# Patient Record
Sex: FEMALE | Race: WHITE | NOT HISPANIC OR LATINO | Employment: OTHER | URBAN - METROPOLITAN AREA
[De-identification: names, ages, dates, MRNs, and addresses within clinical notes are randomized per-mention and may not be internally consistent; named-entity substitution may affect disease eponyms.]

---

## 2017-10-03 ENCOUNTER — APPOINTMENT (OUTPATIENT)
Dept: CT IMAGING | Facility: HOSPITAL | Age: 82
End: 2017-10-03

## 2017-10-03 ENCOUNTER — HOSPITAL ENCOUNTER (INPATIENT)
Facility: HOSPITAL | Age: 82
LOS: 6 days | Discharge: NURSING, REHAB OR LONG-TERM CARE | End: 2017-10-09
Attending: INTERNAL MEDICINE | Admitting: HOSPITALIST

## 2017-10-03 DIAGNOSIS — R13.13 PHARYNGEAL DYSPHAGIA: Primary | ICD-10-CM

## 2017-10-03 DIAGNOSIS — Z78.9 IMPAIRED MOBILITY AND ADLS: ICD-10-CM

## 2017-10-03 DIAGNOSIS — Z74.09 IMPAIRED FUNCTIONAL MOBILITY, BALANCE, GAIT, AND ENDURANCE: ICD-10-CM

## 2017-10-03 DIAGNOSIS — Z74.09 IMPAIRED MOBILITY AND ADLS: ICD-10-CM

## 2017-10-03 PROBLEM — C34.92 MALIGNANT NEOPLASM OF LEFT LUNG (HCC): Status: ACTIVE | Noted: 2017-10-03

## 2017-10-03 PROBLEM — I10 ESSENTIAL HYPERTENSION: Status: ACTIVE | Noted: 2017-10-03

## 2017-10-03 PROBLEM — G40.909 SEIZURE DISORDER (HCC): Status: ACTIVE | Noted: 2017-10-03

## 2017-10-03 PROBLEM — I44.7 LBBB (LEFT BUNDLE BRANCH BLOCK): Status: ACTIVE | Noted: 2017-10-03

## 2017-10-03 PROBLEM — R55 SYNCOPE: Status: ACTIVE | Noted: 2017-10-03

## 2017-10-03 PROBLEM — J18.9 BILATERAL PNEUMONIA: Status: ACTIVE | Noted: 2017-10-03

## 2017-10-03 PROBLEM — G47.33 OSA TREATED WITH BIPAP: Status: ACTIVE | Noted: 2017-10-03

## 2017-10-03 PROBLEM — J96.01 ACUTE RESPIRATORY FAILURE WITH HYPOXIA (HCC): Status: ACTIVE | Noted: 2017-10-03

## 2017-10-03 LAB
ALBUMIN SERPL-MCNC: 3 G/DL (ref 3.2–4.8)
ALBUMIN/GLOB SERPL: 1.1 G/DL (ref 1.5–2.5)
ALP SERPL-CCNC: 90 U/L (ref 25–100)
ALT SERPL W P-5'-P-CCNC: 30 U/L (ref 7–40)
ANION GAP SERPL CALCULATED.3IONS-SCNC: 2 MMOL/L (ref 3–11)
AST SERPL-CCNC: 34 U/L (ref 0–33)
BASOPHILS # BLD AUTO: 0.02 10*3/MM3 (ref 0–0.2)
BASOPHILS NFR BLD AUTO: 0.2 % (ref 0–1)
BILIRUB SERPL-MCNC: 0.5 MG/DL (ref 0.3–1.2)
BNP SERPL-MCNC: 822 PG/ML (ref 0–100)
BUN BLD-MCNC: 13 MG/DL (ref 9–23)
BUN/CREAT SERPL: 16.3 (ref 7–25)
CALCIUM SPEC-SCNC: 8.9 MG/DL (ref 8.7–10.4)
CHLORIDE SERPL-SCNC: 107 MMOL/L (ref 99–109)
CO2 SERPL-SCNC: 29 MMOL/L (ref 20–31)
CREAT BLD-MCNC: 0.8 MG/DL (ref 0.6–1.3)
DEPRECATED RDW RBC AUTO: 48.8 FL (ref 37–54)
EOSINOPHIL # BLD AUTO: 0.3 10*3/MM3 (ref 0–0.3)
EOSINOPHIL NFR BLD AUTO: 3 % (ref 0–3)
ERYTHROCYTE [DISTWIDTH] IN BLOOD BY AUTOMATED COUNT: 14.6 % (ref 11.3–14.5)
GFR SERPL CREATININE-BSD FRML MDRD: 69 ML/MIN/1.73
GLOBULIN UR ELPH-MCNC: 2.7 GM/DL
GLUCOSE BLD-MCNC: 102 MG/DL (ref 70–100)
GLUCOSE BLDC GLUCOMTR-MCNC: 100 MG/DL (ref 70–130)
GLUCOSE BLDC GLUCOMTR-MCNC: 117 MG/DL (ref 70–130)
GLUCOSE BLDC GLUCOMTR-MCNC: 127 MG/DL (ref 70–130)
HCT VFR BLD AUTO: 33.9 % (ref 34.5–44)
HGB BLD-MCNC: 11.1 G/DL (ref 11.5–15.5)
IMM GRANULOCYTES # BLD: 0.02 10*3/MM3 (ref 0–0.03)
IMM GRANULOCYTES NFR BLD: 0.2 % (ref 0–0.6)
LYMPHOCYTES # BLD AUTO: 0.94 10*3/MM3 (ref 0.6–4.8)
LYMPHOCYTES NFR BLD AUTO: 9.3 % (ref 24–44)
MCH RBC QN AUTO: 30.1 PG (ref 27–31)
MCHC RBC AUTO-ENTMCNC: 32.7 G/DL (ref 32–36)
MCV RBC AUTO: 91.9 FL (ref 80–99)
MONOCYTES # BLD AUTO: 0.83 10*3/MM3 (ref 0–1)
MONOCYTES NFR BLD AUTO: 8.2 % (ref 0–12)
NEUTROPHILS # BLD AUTO: 8.05 10*3/MM3 (ref 1.5–8.3)
NEUTROPHILS NFR BLD AUTO: 79.1 % (ref 41–71)
PLATELET # BLD AUTO: 234 10*3/MM3 (ref 150–450)
PMV BLD AUTO: 9.4 FL (ref 6–12)
POTASSIUM BLD-SCNC: 4.3 MMOL/L (ref 3.5–5.5)
PROT SERPL-MCNC: 5.7 G/DL (ref 5.7–8.2)
RBC # BLD AUTO: 3.69 10*6/MM3 (ref 3.89–5.14)
SODIUM BLD-SCNC: 138 MMOL/L (ref 132–146)
WBC NRBC COR # BLD: 10.16 10*3/MM3 (ref 3.5–10.8)

## 2017-10-03 PROCEDURE — 85025 COMPLETE CBC W/AUTO DIFF WBC: CPT | Performed by: INTERNAL MEDICINE

## 2017-10-03 PROCEDURE — 25010000002 ENOXAPARIN PER 10 MG: Performed by: INTERNAL MEDICINE

## 2017-10-03 PROCEDURE — 80156 ASSAY CARBAMAZEPINE TOTAL: CPT | Performed by: PSYCHIATRY & NEUROLOGY

## 2017-10-03 PROCEDURE — 71275 CT ANGIOGRAPHY CHEST: CPT

## 2017-10-03 PROCEDURE — 63710000001 DIPHENHYDRAMINE PER 50 MG: Performed by: INTERNAL MEDICINE

## 2017-10-03 PROCEDURE — 82962 GLUCOSE BLOOD TEST: CPT

## 2017-10-03 PROCEDURE — 83880 ASSAY OF NATRIURETIC PEPTIDE: CPT | Performed by: INTERNAL MEDICINE

## 2017-10-03 PROCEDURE — 93005 ELECTROCARDIOGRAM TRACING: CPT | Performed by: PHYSICIAN ASSISTANT

## 2017-10-03 PROCEDURE — 25010000003 HYDROCORTISONE SOD SUCCINATE PF 250 MG RECONSTITUTED SOLUTION: Performed by: INTERNAL MEDICINE

## 2017-10-03 PROCEDURE — 80053 COMPREHEN METABOLIC PANEL: CPT | Performed by: INTERNAL MEDICINE

## 2017-10-03 PROCEDURE — 99223 1ST HOSP IP/OBS HIGH 75: CPT | Performed by: INTERNAL MEDICINE

## 2017-10-03 PROCEDURE — 0 IOPAMIDOL PER 1 ML: Performed by: INTERNAL MEDICINE

## 2017-10-03 PROCEDURE — 93010 ELECTROCARDIOGRAM REPORT: CPT | Performed by: INTERNAL MEDICINE

## 2017-10-03 RX ORDER — GLIMEPIRIDE 2 MG/1
2 TABLET ORAL
COMMUNITY

## 2017-10-03 RX ORDER — TORSEMIDE 100 MG/1
100 TABLET ORAL DAILY
COMMUNITY
End: 2017-10-09 | Stop reason: HOSPADM

## 2017-10-03 RX ORDER — ASPIRIN 81 MG/1
81 TABLET ORAL DAILY
COMMUNITY

## 2017-10-03 RX ORDER — DEXTROSE MONOHYDRATE 25 G/50ML
25 INJECTION, SOLUTION INTRAVENOUS
Status: DISCONTINUED | OUTPATIENT
Start: 2017-10-03 | End: 2017-10-09 | Stop reason: HOSPADM

## 2017-10-03 RX ORDER — ERGOCALCIFEROL 1.25 MG/1
50000 CAPSULE ORAL WEEKLY
COMMUNITY

## 2017-10-03 RX ORDER — MEMANTINE HYDROCHLORIDE 5 MG/1
5 TABLET ORAL 2 TIMES DAILY
COMMUNITY

## 2017-10-03 RX ORDER — CARVEDILOL 3.12 MG/1
3.12 TABLET ORAL 2 TIMES DAILY WITH MEALS
COMMUNITY
End: 2017-10-09 | Stop reason: HOSPADM

## 2017-10-03 RX ORDER — DOXAZOSIN 2 MG/1
2 TABLET ORAL NIGHTLY
COMMUNITY
End: 2017-10-09 | Stop reason: HOSPADM

## 2017-10-03 RX ORDER — DIPHENHYDRAMINE HCL 50 MG
50 CAPSULE ORAL ONCE
Status: COMPLETED | OUTPATIENT
Start: 2017-10-03 | End: 2017-10-03

## 2017-10-03 RX ORDER — POTASSIUM CITRATE 5 MEQ/1
TABLET, EXTENDED RELEASE ORAL
COMMUNITY
End: 2017-10-09 | Stop reason: HOSPADM

## 2017-10-03 RX ORDER — LISINOPRIL 5 MG/1
5 TABLET ORAL DAILY
COMMUNITY
End: 2017-10-09 | Stop reason: HOSPADM

## 2017-10-03 RX ORDER — ALLOPURINOL 300 MG/1
300 TABLET ORAL DAILY
COMMUNITY

## 2017-10-03 RX ORDER — HYDRALAZINE HYDROCHLORIDE 20 MG/ML
10 INJECTION INTRAMUSCULAR; INTRAVENOUS EVERY 6 HOURS PRN
Status: DISCONTINUED | OUTPATIENT
Start: 2017-10-03 | End: 2017-10-09 | Stop reason: HOSPADM

## 2017-10-03 RX ORDER — CARBAMAZEPINE 200 MG/1
200 TABLET, EXTENDED RELEASE ORAL 2 TIMES DAILY
COMMUNITY

## 2017-10-03 RX ORDER — NICOTINE POLACRILEX 4 MG
15 LOZENGE BUCCAL
Status: DISCONTINUED | OUTPATIENT
Start: 2017-10-03 | End: 2017-10-09 | Stop reason: HOSPADM

## 2017-10-03 RX ORDER — SODIUM CHLORIDE 0.9 % (FLUSH) 0.9 %
1-10 SYRINGE (ML) INJECTION AS NEEDED
Status: DISCONTINUED | OUTPATIENT
Start: 2017-10-03 | End: 2017-10-09 | Stop reason: HOSPADM

## 2017-10-03 RX ADMIN — CEFEPIME HYDROCHLORIDE 1 G: 1 INJECTION, POWDER, FOR SOLUTION INTRAMUSCULAR; INTRAVENOUS at 22:54

## 2017-10-03 RX ADMIN — ENOXAPARIN SODIUM 40 MG: 40 INJECTION SUBCUTANEOUS at 14:24

## 2017-10-03 RX ADMIN — HYDROCORTISONE SODIUM SUCCINATE 200 MG: 250 INJECTION, POWDER, FOR SOLUTION INTRAMUSCULAR; INTRAVENOUS at 12:45

## 2017-10-03 RX ADMIN — IOPAMIDOL 72 ML: 755 INJECTION, SOLUTION INTRAVENOUS at 13:38

## 2017-10-03 RX ADMIN — CEFEPIME HYDROCHLORIDE 1 G: 1 INJECTION, POWDER, FOR SOLUTION INTRAMUSCULAR; INTRAVENOUS at 14:25

## 2017-10-03 RX ADMIN — DIPHENHYDRAMINE HYDROCHLORIDE 50 MG: 50 CAPSULE ORAL at 12:15

## 2017-10-03 NOTE — H&P
[facility] Medicine Services  HISTORY AND PHYSICAL    Primary Care Physician: Damian Miller MD    Subjective     Chief Complaint:  transfer for syncope s/p code    History of Present Illness:   Ms Beck is a pleasant 82 yof with a hx of CVA with residual generalized weakness, well controlled diabetes A1C 5.8%, RENATA on bipap, hx pf lung CA s/p radiation followed at , seizure disorder on tergretol, two years of syncopal episodes. She present from OSH after presenting there 9/26 s/p coding. Per report patient was seating on her chair, passed out had some jerks and was unresponsive requiring chest compressions.On arrival of EMS she was found to be in Vfib/Vtach and was shocked x1. She was intubated and transfered to OSH ICU. She subsequently underwent a LHC that showed clean coronaries and normal EF, she was extubated, CT chest was concerning for pna and was started on abx. Her EKG showed LBBB. The etiology of her syncope is unknown, she was transferred here for likely leadless pacemaker placement. On arrival here patient was hemodynamically stable, on 3LO2, she was awake and alert with some confusion, did endorse chest discomfort from the compression.      Review of Systems   Otherwise complete 10 system ROS performed and negative except as mentioned in the HPI.    Past Medical History:   Diagnosis Date   • Alzheimer disease    • Diabetes mellitus    • Gout    • Hypertension        Past Surgical History:   Procedure Laterality Date   • CATARACT EXTRACTION     • CERVICAL DISCECTOMY ANTERIOR     • HYSTERECTOMY     • JOINT REPLACEMENT      Bilateral Knees       History reviewed. No pertinent family history.    Social History     Social History   • Marital status:      Spouse name: N/A   • Number of children: N/A   • Years of education: N/A     Occupational History   • Not on file.     Social History Main Topics   • Smoking status: Never Smoker   • Smokeless tobacco: Never Used   •  "Alcohol use No   • Drug use: No   • Sexual activity: Defer     Other Topics Concern   • Not on file     Social History Narrative   • No narrative on file       Medications:  No prescriptions prior to admission.       Allergies:  Allergies   Allergen Reactions   • Quinolones Anaphylaxis   • Vancomycin Anaphylaxis   • Aluminum-Containing Compounds Swelling and Rash     Aluminum Hydroxide   • Amoxicillin Rash   • Ciprofloxacin Other (See Comments)     Unknown Reaction   • Levofloxacin Other (See Comments)     Unknown Reaction   • Prednisone Other (See Comments)     Unknown Reaction   • Shrimp Flavor Other (See Comments)     Unknown Reaction   • Cefazolin Diarrhea   • Contrast Dye Rash   • Diclofenac Swelling and Rash   • Etodolac Swelling and Rash   • Ibuprofen Swelling and Rash   • Indomethacin Swelling and Rash   • Ketorolac Swelling and Rash   • Meclofenamate Swelling and Rash   • Mefenamic Acid Swelling and Rash   • Meloxicam Swelling and Rash   • Misoprostol Swelling and Rash   • Naproxen Swelling and Rash   • Oxaprozin Swelling and Rash   • Piroxicam Swelling and Rash   • Sulindac Swelling and Rash         Objective     Physical Exam:  Vital Signs: /81 (BP Location: Left arm, Patient Position: Lying)  Pulse 84  Temp 97.1 °F (36.2 °C) (Temporal Artery )   Resp 18  Ht 66\" (167.6 cm)  Wt 166 lb (75.3 kg)  SpO2 96%  BMI 26.79 kg/m2  Physical Exam    Constitutional: No acute distress, awake, alert  Eyes: PERRLA, sclerae anicteric, no conjunctival injection  HENT: NCAT, mucous membranes moist  Neck: Supple, no thyromegaly, no lymphadenopathy, trachea midline  Respiratory: Clear to auscultation bilaterally, nonlabored respirations   Cardiovascular: RRR, no murmurs, rubs, or gallops, palpable pedal pulses bilaterally  Gastrointestinal: Positive bowel sounds, soft, nontender, nondistended  Musculoskeletal: No bilateral ankle edema, no clubbing or cyanosis to bilateral lower extremities  Psychiatric: Oriented " x 3, appropriate affect, cooperative  Neurologic: Strength symmetric in all extremities, Cranial Nerves grossly intact to confrontation, speech clear  Skin: No rashes    Results Reviewed:    I have personally reviewed and interpreted available lab data, radiology studies and ECG obtained at time of admission.     Assessment / Plan     Problem List:   Hospital Problem List     * (Principal)Syncope with collapse    LBBB (left bundle branch block)    Seizure disorder    RENATA treated with BiPAP    Acute respiratory failure with hypoxia (POA)    Essential hypertension    Malignant neoplasm of left lung s/p radiation    Bilateral pneumonia          Assessment:  82 yof with a hx of CVA with residual generalized weakness, well controlled diabetes A1C 5.8%, RENATA on bipap, hx pf lung CA s/p radiation followed at , seizure disorder and two years of syncopal episodes, she was transferred here for possible leadless pacemaker placement    Plan:  - Hx of syncope with collapse, multiple episodes in the past, etiology unknown, extensive w/u (data deficient) unrevealing, suspect PE with underlying hx of lung malignancy, BNP elevated at OSH remains elevated will get CTA pre-treat before.  - Patient with LBBB, s/p VT/Vfib, LHC with clean coronaries and normal EF, cardiology following.  - Bilateral LL consolidation and opacifications, concerns for aspiration pna, will start cefepime  - consult SLP to evaluate for aspiration, keep NPO for now  - Hx of lung CA s/p radiation x5, no chemo followed at , get records  - Hx of sz disorder, continue home rx  - Hx of well controlled DM A1C 5.8 continue SSI  - Hx of RENATA continue home bipap    DVT prophylaxis: Lovenox    Code Status: Full    Admission Status: Patient will be admitted to INPATIENT status due to the need for care which can only be reasonably provided in an hospital setting such as aggressive/expedited ancillary services and/or consultation services, the necessity for IV  medications, close physician monitoring and/or the possible need for procedures.  In such, I feel patient’s risk for adverse outcomes and need for care warrant INPATIENT evaluation and predict the patient’s care encounter to likely last beyond 2 midnights.     Jie Jimenez MD 10/03/17 1:30 PM

## 2017-10-03 NOTE — CONSULTS
[facility] at [facility] - Cardiology Consult    Sarah Beck  1935  2528/1    Admit Date:  10/3/2017      PCP:  MD Petra Philip MD:  Dr. Jimenez, Hospitalist  Consulting MD:  Jatin Jesus MD    10/03/17    CC:  Weakness,  Confusion, Syncope.  Reason for Consult: Syncope, recurrent.    PROBLEM LIST/PMHx:    1.  Recurrent Syncope   A.  First episode 2 years ago, idb.   B.  Event Monitor/holter 6 months ago reportedly showed no arrhythmia, no pauses.   C.  No associated injuries.  2. LBBB  3.  Essential HTN  4.  Dyslipidemia  5.  Non insulin dependent Diabetes Mellitus II  6.  RENATA with bipap use.  7.  VF Arrest   A.  Syncope and collapse while sitting in chair.  Pt received CPR by family members, 9/26/17.   B.  EMS arrived with  VF noted on AED, received shockk x1.  No further recurrence.   C.  [facility], Dr. Rasmussen:  Normal EF, No significant CAD.   D.  Echo:  EF 65%, LA 2.9 cm, trace MR, mild TR.  8.  Seizure Disorder, on chronic Tegretol  9.  Lung Carcinoma   A.  Left Lower Lobe.  S/P 5 Radiation tx, March 2017.  First CT s/p radiation showed improvement.  Next CT scheduled for October 24.   B. Followed by Dr. Duffy, [facility].   10.  Bilateral Lobe Pneumonia   A.  Associated cough with progression.  LGF at outside facility.   B.  Tx with IV Zyvox, IV Merrem at outside facility   C.  Probable aspiration  11.  Confusion, possibly early dementia  12. History of Bladder Cancer  13.  History of CVA x2; data insufficient.           Allergies:  is allergic to quinolones; vancomycin; aluminum-containing compounds; amoxicillin; ciprofloxacin; levofloxacin; prednisone; shrimp flavor; cefazolin; contrast dye; diclofenac; etodolac; ibuprofen; indomethacin; ketorolac; meclofenamate; mefenamic acid; meloxicam; misoprostol; naproxen; oxaprozin; piroxicam; and sulindac.     prescriptions prior to admission:  Reviewed in transfer records.  Not yet entered in computer at time of consultation.   "          CARDIAC RISK FACTORS:   advanced age (older than 55 for men, 65 for women), diabetes mellitus, dyslipidemia, family history of premature cardiovascular disease and hypertension.         HPI:  Mrs. Beck is an 82-year-old  female with a noted above history, who was initially transferred to [facility] today to undergo possible pacemaker (leadless) implantation.       Over the last 2 years, she's had recurrent syncopal episodes.  The patient and family report there is loss of consciousness but no injury. She lives with her  and grown son.   The family states her activity has been progressively declining, she has more weakness, and her memory is getting worse.  She wore a Holter monitor several months ago for recurrent syncope, which reportedly did not show any arrhythmias or pauses.  She had a recurrent event 6 weeks ago and was hospitalized for observation without any findings.  The family and patient state that she has some type of warning but cannot describe it.  She often finds herself yelling out \"oh no\" prior to an event.  The last two episodes occurred while sitting in a chair.  She cried out and fell on the floor.  CPR was performed by family members prior to EMS arriving.  EMS arrived and the patient was found to be in VF versus pulseless VT.  She did receive a shock ×1 and converted to normal sinus rhythm.  She's had no arrhythmia since admission at the outside facility.  She also was temporarily intubated at outside facility and was extubated without difficulty.  She underwent left heart catheterization:   near normal coronaries, normal EF.  While she was in the hospital, she had an event of confusion and unresponsiveness.  She evidently pulled her port out and all telemetry pads off.  She \"came to\" a few minutes later and could not recall what happened.  The family states this is what occurs at home.     She denies chest pain or exertional dyspnea.  She denies tachycardia or " palpitations. She denies any lower extremity swelling or redness.  She has had a cough for several months that has worsened. She denies production or hemoptysis.  Aspiration has been mentioned to her several times but the family states no formal evaluation has been made.  She is currently followed for lung cancer.  She received 5 rounds of radiation, March 2017.  A follow-up CT of her chest showed improvement, and her next appointment is October 24.  The family and patient state that she has been compliant with all medications including her Tegretol. She does follow routinely with her primary care (Dr. Damian Miller), and her cardiologist (Dr. Rasmussen).     The family at bedside ( and daughter) is quite concerned about her memory, confusion, recurrent syncopal spells, and aspiration.  They are not in favor of proceeding with pacemaker implantation without a more definitive reason.  They have been in contact with [facility] in [place] for anticipated rehabilitation at time of discharge.  I was able to look through transferred records and discuss past medical history with the patient, her , and her daughter.  At this time, we will defer pacemaker implantation.  She will be admitted to the hospitalists.  We will follow along in consultation. Thank you.        Social History     Social History   • Marital status:      Spouse name: N/A   • Number of children: N/A   • Years of education: N/A     Occupational History   • Not on file.     Social History Main Topics   • Smoking status: Never Smoker   • Smokeless tobacco: Never Used   • Alcohol use No   • Drug use: No   • Sexual activity: Defer     Other Topics Concern   • Not on file     Social History Narrative   • No narrative on file     family history:  + HTN, + Diabetes   past surgical history:  Hysterectomy, Port Placement, Bilateral Knee Replacements, C Spine Surgery, Cataract removal.    ROS: Pertinent items are noted in HPI, all  "other systems reviewed and negative     Objective     height is 66\" (167.6 cm) and weight is 166 lb (75.3 kg). Her temporal artery  temperature is 97.1 °F (36.2 °C). Her blood pressure is 165/81 and her pulse is 84. Her respiration is 18 and oxygen saturation is 96%.  No intake or output data in the 24 hours ending 10/03/17 1103      Physical Exam:  General Appearance:    Alert, cooperative, in no acute distress   Head:    Normocephalic, without obvious abnormality, atraumatic   Eyes:            Lids and lashes normal, conjunctivae and sclerae normal, no   icterus, no pallor, corneas clear, PERRLA   Ears:    Ears appear intact with no abnormalities noted   Throat:   No oral lesions, no thrush, oral mucosa moist.   Neck:   No adenopathy, supple, trachea midline, no thyromegaly, no     carotid bruit, no JVD   Back:     No kyphosis present, no scoliosis present, no skin lesions,       erythema or scars, no tenderness to percussion or                   palpation,   range of motion normal   Lungs:     Clear to auscultation but coarse, respirations regular, even and unlabored.  Decreased at bases. + Cough.    Heart:    Regular rhythm and normal rate, normal S1 and S2, no            murmur, no gallop, no rub, no click       Abdomen:     Normal bowel sounds, no masses, no organomegaly, soft        non-tender, non-distended, no guarding, no rebound                 Tenderness.  Obese.       Extremities:   Moves all extremities well,  no cyanosis, no redness, no edema   Pulses:   Pulses palpable and equal bilaterally   Skin:   No bleeding, bruising or rash.  Port right side neck.   Lymph nodes:   No palpable adenopathy   Neurologic:   Cranial nerves 2 - 12 grossly intact, sensation intact, DTR        present and equal bilaterally          Results Review:  I personally reviewed the patient's clinical results.          I reviewed labs from outside facility.  No current labs have been drawn here at [facility].    "     Radiology:  Imaging Results (last 72 hours)     ** No results found for the last 72 hours. **            Tele:  NSR, LBBB    Assessment/Plan     1.  Recurrent Syncope, without injury:   -  To date, no documented arrhythmias or pauses.  Patient wore an event monitor several months ago - we'll try to obtain results.   -  Rule out sinus arrest.   -  Hypercoagulable.  Consider CTA.       2.  Ventricular fibrillation   -  No records available at time of transfer.  Try to obtain any telemetry strips of arrhythmia.   -  Patient did receive CPR and shock ×1.   -  Essentially normal coronary arteries, normal EF.    3.  Left bundle branch block    4.  Pneumonia   -  Progressive cough over the last 6 months, worrisome for aspiration.   -  CT chest at outside facility shows bilateral lobe pneumonia.  Treated with IV Merrem, IV Zyvox.    5.  Seizure disorder   -  Diagnosed 20 years ago.  On chronic Tegretol.    6.  Essential hypertension   -  Poorly controlled.  Adjust current medications.   -  Monitor renal function.    7.  RENATA   -  Continue with BiPAP    8.  Left lower lobe lung carcinoma   -  Status post radiation ×5, March 2017.  First CT s/p radiation showed improvement.  Next CT scheduled for October 24.   -  Followed by Dr. Duffy, [facility].         I discussed the patients findings and my recommendations with the patient, any present family members, and the nursing staff.  Jatin Jesus MD saw and examined patient, verified hx and PE, read all radiographic studies, reviewed labs and micro data, and formulated dx, plan for treatment and all medical decision making.      Peggy Holden PA-C, functioning as a scribe for Jatin Jesus MD  10/03/17 11:03 AM      EMR Dragon/Transcription disclaimer:   Much of this encounter note is an electronic transcription/translation of spoken language to printed text. The electronic translation of spoken language may permit erroneous, or at times, nonsensical words or phrases to be  inadvertently transcribed; Although I have reviewed the note for such errors, some may still exist.

## 2017-10-03 NOTE — SIGNIFICANT NOTE
10/03/17 1432   SLP Deferred Reason   SLP Deferred Reason Patient unavailable for evaluation  (New orders received. Pt RJ for procedure. SLP to follow up 10/4.)

## 2017-10-03 NOTE — PROGRESS NOTES
"Pharmacy Consult-Cefepime Dosing  Sarah Beck is a  82 y.o. female receiving cefepime therapy.     Indication: Pneumonia  Consulting Provider: Tony  ID Consult: No    Ht - 66\" (167.6 cm)  Wt - 166 lb (75.3 kg)    Current Antimicrobial Therapy  Cefepime 1g IV q8h (extended-infusion)    Patient is in CVOU, SCr has not been collected yet. Unknown renal function at this time. Will plan to dose aggressive up front and follow results of CMP and assess need to adjust cefepime dose.     Allergies  Quinolones; Vancomycin; Aluminum-containing compounds; Amoxicillin; Ciprofloxacin; Levofloxacin; Prednisone; Shrimp flavor; Cefazolin; Contrast dye; Diclofenac; Etodolac; Ibuprofen; Indomethacin; Ketorolac; Meclofenamate; Mefenamic acid; Meloxicam; Misoprostol; Naproxen; Oxaprozin; Piroxicam; and Sulindac    Jes Hernandez, Ashley  Pharmacy Resident  10/3/2017  12:27 PM  "

## 2017-10-03 NOTE — NURSING NOTE
ACC REVIEW REPORT: [facility]        PATIENT NAME: Sarah Beck    PATIENT ID: [id number]    BED: 9801    BED TYPE: CVOU- TELE    BED GIVEN TO:DAYAN    TIME BED GIVEN: 0450    YOB: 1935    AGE: 82 YEARS OLD    GENDER: F    TODAY'S DATE: 10/3/2017    TRANSFER DATE: 10/03/2017    ETA: 0700    TRANSFERRING FACILITY: [facility]    TRANSFERRING FACILITY PHONE # : [phone]    DATE/TIME REQUEST RECEIVED: [address] [place] [address] RN: BERNIE SAEED RN    REPORT FROM: DAYAN- CVU    TIME REPORT TAKEN: 0450    DIAGNOSIS: SSS    REASON FOR TRANSFER TO [facility]: HIGHER LEVEL OF CARE    TRANSPORTATION: AMBULANCE    CLINICAL REASON FOR TRANSFER TO [facility]: PT WENT INTO CARDIAC ARREST AT HOME, SON STARTED CPR UNTIL EMS ARRIVAL AND PT WAS TRANSPORTED TO [facility] ON SEPT. 26TH AND SPENT 3 DAYS IN THE ICU AND THEN TRANSFERRED TO CVU.       CLINICAL INFORMATION    ALLERGIES: CONTRAST DYE; AMOXICILLIN; CEFZOLIN; CIPRO; IBUPROFEN; INDOMETHACIN; TORADOL; LEVAQUIN; NAPROXEN; PREDNISONE; VANCOMYCIN AND MELOXICAM    LAST VITAL SIGNS:  TIME: 0445  PULSE: 86 SINUS RHYTHM   B/P:147/63  RESP:18    LAB INFORMATION: BNP 1600- 09/30/17    MEDS/IV FLUIDS: TRIPLE LUMEN RIGHT NECK      CARDIAC SYSTEM:    CHEST PAIN: NO    RATE: 86    RHYTHM: SINUS RHYTHM    Is patient taking or has patient been given any drugs that could increase bleeding?HEPARIN WHILE ADMITTED       CARDIAC ENZYMES:    DATE: 09/26/17  TROP: 0.065    DATE: 09/28/17  TROP: 0.2      HEART CATH: NEGATIVE FOR INTERVENTION    OXYGEN: 3L PER NC    O2 SAT: 96%    ADMINISTRATION ROUTE: NASAL CANNULA    CNS/MUSCULOSKELETAL    ALERT AND ORIENTED: A/O x4    JORGE COMA SCALE: GCS 15    CNS/MUSCULOSKELETAL NOTES: PT HAS DRESSING TO RIGHT GROIN S/P HEART CATH    PAST MEDICAL HISTORY: CVA x2 WITH NO KNOWN DEFICITS; BLADDER CANCER WITH RADIATION; SEIZURES; DIABETES, MI, SLEEP APNEA WITH HOME BIPAP USE    ADDITIONAL NOTES: ON 10/1/17 PT HAD AN EPISODE IN THE HOSPITAL  WHERE SHE BECAME CONFUSED, PULLED OUT DEEP LINE AND WENT INTO SINUS ARREST, NO INTERVENTION WAS NEEDED.           Miguelina Kirk RN  10/3/2017  4:52 AM

## 2017-10-04 ENCOUNTER — APPOINTMENT (OUTPATIENT)
Dept: GENERAL RADIOLOGY | Facility: HOSPITAL | Age: 82
End: 2017-10-04
Attending: INTERNAL MEDICINE

## 2017-10-04 ENCOUNTER — APPOINTMENT (OUTPATIENT)
Dept: MRI IMAGING | Facility: HOSPITAL | Age: 82
End: 2017-10-04

## 2017-10-04 LAB
CARBAMAZEPINE SERPL-MCNC: 8 MCG/ML (ref 4–10)
GLUCOSE BLDC GLUCOMTR-MCNC: 102 MG/DL (ref 70–130)
GLUCOSE BLDC GLUCOMTR-MCNC: 132 MG/DL (ref 70–130)
GLUCOSE BLDC GLUCOMTR-MCNC: 92 MG/DL (ref 70–130)
GLUCOSE BLDC GLUCOMTR-MCNC: 97 MG/DL (ref 70–130)

## 2017-10-04 PROCEDURE — 25010000002 ENOXAPARIN PER 10 MG: Performed by: INTERNAL MEDICINE

## 2017-10-04 PROCEDURE — 99223 1ST HOSP IP/OBS HIGH 75: CPT | Performed by: PSYCHIATRY & NEUROLOGY

## 2017-10-04 PROCEDURE — 74230 X-RAY XM SWLNG FUNCJ C+: CPT

## 2017-10-04 PROCEDURE — 92610 EVALUATE SWALLOWING FUNCTION: CPT

## 2017-10-04 PROCEDURE — 70551 MRI BRAIN STEM W/O DYE: CPT

## 2017-10-04 PROCEDURE — 25010000002 CEFEPIME

## 2017-10-04 PROCEDURE — 92611 MOTION FLUOROSCOPY/SWALLOW: CPT

## 2017-10-04 PROCEDURE — 99232 SBSQ HOSP IP/OBS MODERATE 35: CPT | Performed by: INTERNAL MEDICINE

## 2017-10-04 PROCEDURE — 82962 GLUCOSE BLOOD TEST: CPT

## 2017-10-04 RX ORDER — CARBAMAZEPINE 200 MG/1
200 TABLET ORAL EVERY 12 HOURS SCHEDULED
Status: DISCONTINUED | OUTPATIENT
Start: 2017-10-04 | End: 2017-10-09 | Stop reason: HOSPADM

## 2017-10-04 RX ADMIN — ENOXAPARIN SODIUM 40 MG: 40 INJECTION SUBCUTANEOUS at 08:53

## 2017-10-04 RX ADMIN — CEFEPIME HYDROCHLORIDE 1 G: 1 INJECTION, POWDER, FOR SOLUTION INTRAMUSCULAR; INTRAVENOUS at 05:57

## 2017-10-04 RX ADMIN — CARBAMAZEPINE 200 MG: 200 TABLET ORAL at 15:58

## 2017-10-04 RX ADMIN — BARIUM SULFATE 20 ML: 400 PASTE ORAL at 14:31

## 2017-10-04 RX ADMIN — CEFEPIME 2 G: 2 INJECTION, POWDER, FOR SOLUTION INTRAVENOUS at 17:44

## 2017-10-04 RX ADMIN — BARIUM SULFATE 100 ML: 0.81 POWDER, FOR SUSPENSION ORAL at 14:32

## 2017-10-04 RX ADMIN — CARBAMAZEPINE 200 MG: 200 TABLET ORAL at 20:50

## 2017-10-04 NOTE — PLAN OF CARE
Problem: Patient Care Overview (Adult)  Goal: Plan of Care Review  Outcome: Ongoing (interventions implemented as appropriate)    10/04/17 1550   Coping/Psychosocial Response Interventions   Plan Of Care Reviewed With patient;spouse   Patient Care Overview   Progress no change   Outcome Evaluation   Outcome Summary/Follow up Plan pt continues complaints of fatigue, will advance to diet recommendations per SLP         Problem: Fall Risk (Adult)  Goal: Identify Related Risk Factors and Signs and Symptoms  Outcome: Outcome(s) achieved Date Met:  10/04/17  Goal: Absence of Falls  Outcome: Ongoing (interventions implemented as appropriate)    Problem: Older Inpatient, Acutely Ill (Adult)  Goal: Signs and Symptoms of Listed Potential Problems Will be Absent or Manageable (Older Inpatient, Acutely Ill)  Outcome: Ongoing (interventions implemented as appropriate)

## 2017-10-04 NOTE — PLAN OF CARE
"Problem: Patient Care Overview (Adult)  Goal: Plan of Care Review  Outcome: Ongoing (interventions implemented as appropriate)    10/04/17 1202   Coping/Psychosocial Response Interventions   Plan Of Care Reviewed With patient;family   Patient Care Overview   Progress (eval)   Outcome Evaluation   Outcome Summary/Follow up Plan Clinical dysphagia eval complete. Pt alert, cooperative, though confused. Pt's daughter reports a swallow evaluation was completed at [facility] recently. It was recommended the pt be on a regular diet and thin liquids; however, the pt's daughter continues to express concern. Pt and pt's daughter report foods and liquids recently becoming difficult to swallow and foods feeling \"stuck\" in throat. Trialed thins via spoon/cup/straw, nectar thick via cup/straw, puree, and solid. Immediate cough w/ thins. No overt clinical s/s aspiration w/ any other consistencies.   REC: continue NPO. Meds crushed w/ applesauce. SLP will f/u w/ MBS to r/o pharyngeal dysphagia.            "

## 2017-10-04 NOTE — PROGRESS NOTES
"      HOSPITALIST DAILY PROGRESS NOTE    Chief Complaint: f/u for syncope    Subjective   SUBJECTIVE/OVERNIGHT EVENTS   No acute events overnight, patient states that she is doing well, denies any CP no SOA, does endorse a cough    Review of Systems:  Gen-no fevers, no chills  CV-no chest pain, no palpitations  Resp-+ cough, no dyspnea  GI-no N/V/D, no abd pain    Otherwise complete ROS is negative except as mentioned in the HPI.    Objective   OBJECTIVE   I have reviewed the vital signs.  /60 (BP Location: Left arm, Patient Position: Lying)  Pulse 95  Temp 98.3 °F (36.8 °C) (Temporal Artery )   Resp 18  Ht 66\" (167.6 cm)  Wt 158 lb 4.8 oz (71.8 kg)  SpO2 93%  BMI 25.55 kg/m2    Physical Exam:  Gen-no acute distress, seated in chair comfortably  CV-RRR, S1 S2 normal, no m/r/g  Resp-CTAB, no wheezes  Abd-soft, NT, ND, +BS  Ext-no edema  Neuro-A&Ox3, no focal deficits  Psych-appropriate mood and affect    Results:  I have reviewed the labs, radiology results, and diagnostic studies.      Results from last 7 days  Lab Units 10/03/17  1224   WBC 10*3/mm3 10.16   HEMOGLOBIN g/dL 11.1*   HEMATOCRIT % 33.9*   PLATELETS 10*3/mm3 234       Results from last 7 days  Lab Units 10/03/17  1224   SODIUM mmol/L 138   POTASSIUM mmol/L 4.3   CHLORIDE mmol/L 107   CO2 mmol/L 29.0   BUN mg/dL 13   CREATININE mg/dL 0.80   GLUCOSE mg/dL 102*   CALCIUM mg/dL 8.9     Radiology Results:  Imaging Results (last 24 hours)     Procedure Component Value Units Date/Time    CT Angiogram Chest With & Without Contrast [[id number]] Collected:  10/03/17 1356     Updated:  10/03/17 1651    Narrative:       EXAMINATION: CT ANGIOGRAM CHEST W WO CONTRAST- 10/03/2017     INDICATION: syncope with collapse      TECHNIQUE: CT angiogram chest with and without intravenous contrast. 2-D  reconstructions performed.     The radiation dose reduction device was turned on for each scan per the  ALARA (As Low as Reasonably Achievable) protocol.   "   COMPARISON: NONE     FINDINGS: Thyroid is homogeneous in attenuation without dominant nodule  identified. No bulky mediastinal adenopathy. Cardiac size enlarged  without pericardial effusion. Atherosclerotic involvement of the aortic  arch and great vessel origins without significant luminal impact.  Nonaneurysmal thoracic aorta. No evidence for acute aortic injury,  pseudoaneurysm or dissection. Visualized portions of the abdominal aorta  demonstrate moderate to severe luminal narrowing of the celiac origin as  well as mild to moderate SMA origin narrowing. Central pulmonary  arterial system is well-opacified without filling defect to suggest  pulmonary embolus. Central airways are patent. Extended lung windows  demonstrate confluent opacifications involving the left lower lobe  superior and lateral segments with air bronchograms present. Bilateral  small volume pleural effusions with adjacent atelectasis. Minimal  pulmonary nodularity likely reactive noted within the right upper lobe.  Multilevel degenerative changes of the spine without aggressive osseous  lesion identified. ACDF hardware in place lower cervical segments. No  aggressive soft tissue body wall findings. Visualized portions of the  upper abdomen grossly unremarkable with the exception of aforementioned  vascular findings.       Impression:       1. No evidence for acute aortic injury, pseudoaneurysm or dissection  with atherosclerotic involvement greatest in the abdominal portions  involving the celiac and SMA origins.  2. No central pulmonary filling defect to suggest pulmonary embolus.  3. Consolidative opacifications left lower lobe consistent with acute  infiltrates.  4. Bilateral small volume pleural effusions.     D:  10/03/2017  E:  10/03/2017     This report was finalized on 10/3/2017 4:49 PM by Dr. Chucho Peña.           I have reviewed the medications.    Assessment/Plan   ASSESSMENT/PLAN    Principal Problem:    Syncope with  collapse  Active Problems:    LBBB (left bundle branch block)    Seizure disorder    RENATA treated with BiPAP    Acute respiratory failure with hypoxia (POA)    Essential hypertension    Malignant neoplasm of left lung s/p radiation    Bilateral pneumonia    82 yof with a hx of CVA with residual generalized weakness, well controlled diabetes A1C 5.8%, RENATA on bipap, hx pf lung CA s/p radiation followed at , seizure disorder and two years of syncopal episodes, she was transferred here for possible leadless pacemaker placement.     Plan:  - Hx of syncope with collapse, multiple episodes in the past, etiology unknown, extensive w/u (data deficient) unrevealing, suspected PE CTA negative, episodes preceded by seizure like activity,will ask neurology to assist.  - Patient with LBBB, s/p VT/Vfib, LHC with clean coronaries and normal EF, cardiology following.  - Bilateral LL consolidation and opacifications, concerns for aspiration pna, will start cefepime, consult SLP to evaluate for aspiration, keep NPO for now  - Hx of lung CA s/p radiation x5, no chemo followed at , get records  - Hx of sz disorder, continue home rx  - Hx of well controlled DM A1C 5.8 continue SSI  - Hx of RENATA continue home bipap     DVT prophylaxis: Lovenox    Dispo: anticipate d/c to rehab    Jie Jimenez MD  10/04/17  9:55 AM

## 2017-10-04 NOTE — MBS/VFSS/FEES
Acute Care - Speech Language Pathology   Swallow Initial Evaluation  [facility]   Modified Barium Swallow Study (MBS)       Patient Name: Sarah Beck  : 1935  MRN: [id number]  Today's Date: 10/4/2017               Admit Date: 10/3/2017    Visit Dx:     ICD-10-CM ICD-9-CM   1. Pharyngeal dysphagia R13.13 787.23     Patient Active Problem List   Diagnosis   • Syncope with collapse   • LBBB (left bundle branch block)   • Seizure disorder   • RENATA treated with BiPAP   • Acute respiratory failure with hypoxia (POA)   • Essential hypertension   • Malignant neoplasm of left lung s/p radiation   • Bilateral pneumonia     Past Medical History:   Diagnosis Date   • Alzheimer disease    • Diabetes mellitus    • Gout    • Hypertension    • Lung cancer    • Seizure    • Sleep apnea     Bipap HS     Past Surgical History:   Procedure Laterality Date   • CATARACT EXTRACTION     • CERVICAL DISCECTOMY ANTERIOR     • HYSTERECTOMY     • JOINT REPLACEMENT      Bilateral Knees          SWALLOW EVALUATION (last 72 hours)      Swallow Evaluation       10/04/17 1430 10/04/17 0900             Rehab Evaluation    Document Type evaluation  -LS evaluation  -LS (r) AS (t) LS (c)       Subjective Information no complaints;agree to therapy  -LS no complaints;agree to therapy  -LS (r) AS (t) LS (c)       General Information    Patient Profile Review yes  -LS yes  -LS (r) AS (t) LS (c)       Onset of Illness/Injury 10/03/17  -LS 10/03/17  -LS (r) AS (t) LS (c)       Subjective Patient Observations alert and cooperative  -LS Alert and cooperative, though somewhat confused. Pt's family present in room.   -LS (r) AS (t) LS (c)       Pertinent History Of Current Problem   Admit for syncope, bilateral pneumonia, caridiac arrest (). PMH: seizure disorder, acute respiratory failure, high blood pressure.   -LS Admit for syncope, bilateral pneumonia, caridiac arrest (). PMH: seizure disorder, acute respiratory failure, high blood  pressure.  -LS (r) AS (t) LS (c)       Current Diet Limitations NPO  -LS NPO  -LS (r) AS (t) LS (c)       Precautions/Limitations, Vision WFL with corrective lenses  -LS WFL;other (see comments)   for purposes of eval   -LS (r) AS (t) LS (c)       Precautions/Limitations, Hearing WFL;other (see comments)   for this eval  -LS WFL;other (see comments)   for purposes of eval   -LS (r) AS (t) LS (c)       Prior Level of Function- Communication functional in all spheres  -LS functional in all spheres;other (comment)   for purposes of eval   -LS (r) AS (t) LS (c)       Prior Level of Function- Swallowing no diet consistency restrictions  -LS no diet consistency restrictions  -LS (r) AS (t) LS (c)       Plans/Goals Discussed With patient and family;agreed upon  -LS patient;family;agreed upon  -LS (r) AS (t) LS (c)       Barriers to Rehab none identified  -LS none identified  -LS (r) AS (t) LS (c)       Clinical Impression    Patient's Goals For Discharge return to PO diet  -LS return to PO diet  -LS (r) AS (t) LS (c)       Family Goals For Discharge patient able to eat/drink without coughing/choking  -LS patient able to return to PO diet  -LS (r) AS (t) LS (c)       SLP Swallowing Diagnosis mild dysphagia;pharyngeal dysfunction  -LS other (see comments)   r/o oropharyngeal dysphagia  -LS (r) AS (t) LS (c)       Rehab Potential/Prognosis, Swallowing good, to achieve stated therapy goals  -LS good, to achieve stated therapy goals  -LS (r) AS (t) LS (c)       Criteria for Skilled Therapeutic Interventions Met skilled criteria for dysphagia intervention met  -LS demonstrates skilled criteria for intervention;other (see comments)   SLP will f/u w/ MBSS  -LS (r) AS (t) LS (c)       SLP Diet Recommendation regular textures;thin liquids  -LS NPO: unsafe for food/liquid intake  -LS (r) AS (t) LS (c)       Recommended Diagnostics  reassess via VFSS (MBS)  -LS (r) AS (t) LS (c)       SLP Rec. for Method of Medication Administration  "meds whole with thin liquid;meds whole in pudding/applesauce  -LS meds crushed in pudding/applesauce  -LS (r) AS (t) LS (c)       Monitor For Signs Of Aspiration cough;throat clearing  -LS cough;throat clearing  -LS (r) AS (t) LS (c)       Pain Assessment    Pain Assessment No/denies pain  -LS No/denies pain  -LS (r) AS (t) LS (c)       Oral Motor Structure and Function    Oral Motor Anatomy and Physiology  patient demonstrates anatomy and physiology that is WNL  -LS (r) AS (t) LS (c)       Dentition Assessment  missing teeth;poor oral hygiene  -LS (r) AS (t) LS (c)       Secretion Management  WNL/WFL  -LS (r) AS (t) LS (c)       Mucosal Quality  dry  -LS (r) AS (t) LS (c)       Volitional Swallow  no difficulties initiating volitional swallow  -LS (r) AS (t) LS (c)       Volitional Cough  no difficulties initiating volitional cough  -LS (r) AS (t) LS (c)       Oral Musculature General Assessment  WFL (within functional limits)  -LS (r) AS (t) LS (c)       General Feeding/Swallowing Observations    Current Feeding Method  NPO  -LS (r) AS (t) LS (c)       Respiratory Support Currently in Use  nasal cannula in use  -LS (r) AS (t) LS (c)       Observations of Posture During Feeding  upright in bed  -LS (r) AS (t) LS (c)       Clinical Swallow Exam    Mode of Presentation  fed by clinician;cup;spoon;straw  -LS (r) AS (t) LS (c)       Oral Phase Results  intact oral phase without signs of dysfunction  -LS (r) AS (t) LS (c)       Pharyngeal Phase Results  cough  -LS (r) AS (t) LS (c)       Summary of Clinical Exam  Clinical dysphagia eval complete. Pt alert, cooperative, though confused. Pt's daughter reports a swallow evaluation was completed at [facility] recently. It was recommended the pt be on a regular diet and thin liquids; however, the pt's daughter continues to express concern. Pt and pt's daughter report foods and liquids recently becoming difficult to swallow and foods feeling \"stuck\" in throat. Trialed " thins via spoon/cup/straw, nectar thick via cup/straw, puree, and solid. Immediate cough w/ thins. No overt clinical s/s aspiration w/ any other consistencies. REC: continue NPO. Meds crushed w/ applesauce. SLP will f/u w/ MBS to r/o pharyngeal dysphagia.   -LS (r) AS (t) LS (c)       Videofluoroscopic Swallowing Exam    Risks/Benefits Reviewed risks/benefits explained;agreed to eval;patient;family  -LS        Positioning Needs for Swallow Exam upright at 90 degrees  -LS        Motor Function During Phonation/Speech    Observation Anatomic Considerations no anatomic structural deviation via videofluroscopy  -LS        VFSS    Mode of Presentation thin:;pudding:;cohesive solid:  -LS        Pharyngeal Phase Physiologic Impairment thin:  -LS        Post Swallow Residue thin:;residue present pyriform sinuses;residue present in valleculae  -LS        Rosenbek's PenAsp Scale thin:;pudding:;cohesive solid:;1-->Level 1  -LS        Pharyngeal Phase Results functional swallow  -LS        Summary of VFSS Mild pharyngeal dysphagia. Oral phase functional with adeqaute mastication and transit time. No pen/asp during this study even when pushed with thins, pudding, solid. Mild-mod pyriform and valleculea residue after the swallow w/ thins 2' decreased pharyngeal stripping and hyoid excursion. Pt with inconsistent spontaneous second swallow that cleared residue. SLP provided cues for second swallow x1. REC: regular diet and thin liqudis, meds whole in thins or pureed. encourage a second swallow after sips of thins. dys tx.  -LS        Swallow Recommendations    Oral Care  oral care with toothbrush and dentifrice BID and PRN  -LS (r) AS (t) LS (c)       Recommended Diet  NPO: unsafe for food/liquid intake  -LS (r) AS (t) LS (c)       Dysphagia Treatment Objectives and Progress    Dysphagia Treatment Objectives Improve hyolaryngeal excursion;Improve tongue base & pharyngeal wall squeeze  -LS        Improve hyolaryngeal excursion     To improve hyolaryngeal excursion, patient will: Complete head lift sustained (comment number of seconds);Complete head lift repetitive (comment number of lifts);Complete chin tuck against resistance (comment number of repetitions);80%;without cues  -LS        Status: Improve hyolaryngeal excursion New  -LS        Improve tongue base & pharyngeal wall squeeze    To improve tongue base & pharyngeal wall squeeze, patient will: Complete effortful swallow;Complete tongue hold swallow;80%;without cues  -LS        Status: Improve tongue base & pharyngeal wall squeeze New  -LS          User Key  (r) = Recorded By, (t) = Taken By, (c) = Cosigned By    Initials Name Effective Dates    LS Usha Hernandez MS [facility]-SLP 06/22/15 -     AS Laurel Mcgill, Speech Therapy Student 08/24/17 -         EDUCATION  The patient has been educated in the following areas:   Dysphagia (Swallowing Impairment) Oral Care/Hydration.    SLP Recommendation and Plan  SLP Swallowing Diagnosis: mild dysphagia, pharyngeal dysfunction  SLP Diet Recommendation: regular textures, thin liquids     SLP Rec. for Method of Medication Administration: meds whole with thin liquid, meds whole in pudding/applesauce  Monitor For Signs Of Aspiration: cough, throat clearing     Criteria for Skilled Therapeutic Interventions Met: skilled criteria for dysphagia intervention met     Rehab Potential/Prognosis, Swallowing: good, to achieve stated therapy goals  Therapy Frequency: 5 times/wk             Plan of Care Review  Plan Of Care Reviewed With: patient  Progress: progress toward functional goals as expected  Outcome Summary/Follow up Plan: MBS complete. Mild pharyngeal dysphagia. Oral phase functional with adeqaute mastication and transit time. No pen/asp during this study even when pushed with thins, pudding, solid. Mild-mod pyriform and valleculea residue after the swallow w/ thins 2' decreased pharyngeal stripping and hyoid excursion. Pt with inconsistent spontaneous  second swallow that cleared residue. SLP provided cues for second swallow x1. REC: regular diet and thin liqudis, meds whole in thins or pureed. encourage a second swallow after sips of thins. dys tx.          IP SLP Goals       10/04/17 1511          Safely Consume Diet    Safely Consume Diet- SLP, Date Established 10/04/17  -LS      Safely Consume Diet- SLP, Time to Achieve by discharge  -LS      Safely Consume Diet- SLP, Date Goal Reviewed 10/04/17  -LS      Safely Consume Diet- SLP, Outcome goal ongoing  -LS        User Key  (r) = Recorded By, (t) = Taken By, (c) = Cosigned By    Initials Name Provider Type    LS Usha Hernandez MS CCC-SLP Speech and Language Pathologist               Time Calculation:         Time Calculation- SLP       10/04/17 1514 10/04/17 1202       Time Calculation- SLP    SLP Start Time 1430  -LS 0900  -LS (r) AS (t) LS (c)     SLP Received On 10/04/17  -LS 10/04/17  -LS (r) AS (t) LS (c)       User Key  (r) = Recorded By, (t) = Taken By, (c) = Cosigned By    Initials Name Provider Type     Usha Hernandez, MS CCC-SLP Speech and Language Pathologist    AS Laurel Mcgill, Speech Therapy Student Speech Therapy Student          Therapy Charges for Today     Code Description Service Date Service Provider Modifiers Qty    [id number] HC ST MOTION FLUORO EVAL SWALLOW 5 10/4/2017 Usha Hernandez, MS CCC-SLP GN 1               Usha Hernandez MS CCC-SLP  10/4/2017

## 2017-10-04 NOTE — THERAPY EVALUATION
Acute Care - Speech Language Pathology   Swallow Initial Evaluation [facility]   Clinical Swallow Evaluation     Patient Name: Sarah Beck  : 1935  MRN: [id number]  Today's Date: 10/4/2017               Admit Date: 10/3/2017    Visit Dx:   No diagnosis found.  Patient Active Problem List   Diagnosis   • Syncope with collapse   • LBBB (left bundle branch block)   • Seizure disorder   • RENATA treated with BiPAP   • Acute respiratory failure with hypoxia (POA)   • Essential hypertension   • Malignant neoplasm of left lung s/p radiation   • Bilateral pneumonia     Past Medical History:   Diagnosis Date   • Alzheimer disease    • Diabetes mellitus    • Gout    • Hypertension    • Lung cancer    • Seizure    • Sleep apnea     Bipap HS     Past Surgical History:   Procedure Laterality Date   • CATARACT EXTRACTION     • CERVICAL DISCECTOMY ANTERIOR     • HYSTERECTOMY     • JOINT REPLACEMENT      Bilateral Knees          SWALLOW EVALUATION (last 72 hours)      Swallow Evaluation       10/04/17 0900                Rehab Evaluation    Document Type (P)  evaluation  -AS        Subjective Information (P)  no complaints;agree to therapy  -AS        General Information    Patient Profile Review (P)  yes  -AS        Onset of Illness/Injury (P)  10/03/17  -AS        Subjective Patient Observations (P)  Alert and cooperative, though somewhat confused. Pt's family present in room.   -AS        Pertinent History Of Current Problem (P)  Admit for syncope, bilateral pneumonia, caridiac arrest (). PMH: seizure disorder, acute respiratory failure, high blood pressure.  -AS        Current Diet Limitations (P)  NPO  -AS        Precautions/Limitations, Vision (P)  WFL;other (see comments)   for purposes of eval   -AS        Precautions/Limitations, Hearing (P)  WFL;other (see comments)   for purposes of eval   -AS        Prior Level of Function- Communication (P)  functional in all spheres;other (comment)   for purposes of  eval   -AS        Prior Level of Function- Swallowing (P)  no diet consistency restrictions  -AS        Plans/Goals Discussed With (P)  patient;family;agreed upon  -AS        Barriers to Rehab (P)  none identified  -AS        Clinical Impression    Patient's Goals For Discharge (P)  return to PO diet  -AS        Family Goals For Discharge (P)  patient able to return to PO diet  -AS        SLP Swallowing Diagnosis (P)  other (see comments)   r/o oropharyngeal dysphagia  -AS        Rehab Potential/Prognosis, Swallowing (P)  good, to achieve stated therapy goals  -AS        Criteria for Skilled Therapeutic Interventions Met (P)  demonstrates skilled criteria for intervention;other (see comments)   SLP will f/u w/ MBSS  -AS        SLP Diet Recommendation (P)  NPO: unsafe for food/liquid intake  -AS        Recommended Diagnostics (P)  reassess via VFSS (MBS)  -AS        SLP Rec. for Method of Medication Administration (P)  meds crushed in pudding/applesauce  -AS        Monitor For Signs Of Aspiration (P)  cough;throat clearing  -AS        Pain Assessment    Pain Assessment (P)  No/denies pain  -AS        Oral Motor Structure and Function    Oral Motor Anatomy and Physiology (P)  patient demonstrates anatomy and physiology that is WNL  -AS        Dentition Assessment (P)  missing teeth;poor oral hygiene  -AS        Secretion Management (P)  WNL/WFL  -AS        Mucosal Quality (P)  dry  -AS        Volitional Swallow (P)  no difficulties initiating volitional swallow  -AS        Volitional Cough (P)  no difficulties initiating volitional cough  -AS        Oral Musculature General Assessment (P)  WFL (within functional limits)  -AS        General Feeding/Swallowing Observations    Current Feeding Method (P)  NPO  -AS        Respiratory Support Currently in Use (P)  nasal cannula in use  -AS        Observations of Posture During Feeding (P)  upright in bed  -AS        Clinical Swallow Exam    Mode of Presentation (P)  fed by  "clinician;cup;spoon;straw  -AS        Oral Phase Results (P)  intact oral phase without signs of dysfunction  -AS        Pharyngeal Phase Results (P)  cough  -AS        Summary of Clinical Exam (P)  Clinical dysphagia eval complete. Pt alert, cooperative, though confused. Pt's daughter reports a swallow evaluation was completed at [facility] recently. It was recommended the pt be on a regular diet and thin liquids; however, the pt's daughter continues to express concern. Pt and pt's daughter report foods and liquids recently becoming difficult to swallow and foods feeling \"stuck\" in throat. Trialed thins via spoon/cup/straw, nectar thick via cup/straw, puree, and solid. Immediate cough w/ thins. No overt clinical s/s aspiration w/ any other consistencies. REC: continue NPO. Meds crushed w/ applesauce. SLP will f/u w/ MBS to r/o pharyngeal dysphagia.   -AS        Swallow Recommendations    Oral Care (P)  oral care with toothbrush and dentifrice BID and PRN  -AS        Recommended Diet (P)  NPO: unsafe for food/liquid intake  -AS          User Key  (r) = Recorded By, (t) = Taken By, (c) = Cosigned By    Initials Name Effective Dates    AS Laurel Mcgill, Speech Therapy Student 08/24/17 -         EDUCATION  The patient has been educated in the following areas:   Dysphagia (Swallowing Impairment) Oral Care/Hydration NPO rationale.    SLP Recommendation and Plan  SLP Swallowing Diagnosis: (P) other (see comments) (r/o oropharyngeal dysphagia)  SLP Diet Recommendation: (P) NPO: unsafe for food/liquid intake     SLP Rec. for Method of Medication Administration: (P) meds crushed in pudding/applesauce  Monitor For Signs Of Aspiration: (P) cough, throat clearing  Recommended Diagnostics: (P) reassess via VFSS (MBS)  Criteria for Skilled Therapeutic Interventions Met: (P) demonstrates skilled criteria for intervention, other (see comments) (SLP will f/u w/ MBSS)     Rehab Potential/Prognosis, Swallowing: (P) good, to " "achieve stated therapy goals                Plan of Care Review  Plan Of Care Reviewed With: (P) patient, family  Progress: (P)  (eval)  Outcome Summary/Follow up Plan: (P) Clinical dysphagia eval complete. Pt alert, cooperative, though confused. Pt's daughter reports a swallow evaluation was completed at [facility] recently. It was recommended the pt be on a regular diet and thin liquids; however, the pt's daughter continues to express concern. Pt and pt's daughter report foods and liquids recently becoming difficult to swallow and foods feeling \"stuck\" in throat. Trialed thins via spoon/cup/straw, nectar thick via cup/straw, puree, and solid. Immediate cough w/ thins. No overt clinical s/s aspiration w/ any other consistencies. REC: continue NPO. Meds crushed w/ applesauce. SLP will f/u w/ MBS to r/o pharyngeal dysphagia.              Time Calculation:         Time Calculation- SLP       10/04/17 1202          Time Calculation- SLP    SLP Start Time (P)  0900  -AS      SLP Received On (P)  10/04/17  -AS        User Key  (r) = Recorded By, (t) = Taken By, (c) = Cosigned By    Initials Name Provider Type    AS Laurel Mcgill Speech Therapy Student Speech Therapy Student          Therapy Charges for Today     Code Description Service Date Service Provider Modifiers Qty    [id number]  ST EVAL ORAL PHARYNG SWALLOW 4 10/4/2017 Laurel Mcgill Speech Therapy Student GN 1               Laurel Mcgill Speech Therapy Student  10/4/2017  "

## 2017-10-04 NOTE — PROGRESS NOTES
Discharge Planning Assessment  [facility]     Patient Name: Sarah Beck  MRN: [id number]  Today's Date: 10/4/2017    Admit Date: 10/3/2017          Discharge Needs Assessment       10/04/17 1621    Living Environment    Lives With spouse;child(froylan), adult    Transportation Available car;family or friend will provide    Living Environment    Provides Primary Care For no one, unable/limited ability to care for self    Quality Of Family Relationships supportive    Able to Return to Prior Living Arrangements no    Discharge Needs Assessment    Concerns To Be Addressed discharge planning concerns    Readmission Within The Last 30 Days previous discharge plan unsuccessful    Anticipated Changes Related to Illness none    Equipment Currently Used at Home rollator;cane, straight;shower chair;grab bar;bipap/ cpap;raised toilet    Equipment Needed After Discharge none    Discharge Facility/Level Of Care Needs nursing facility, skilled            Discharge Plan       10/04/17 1641    Case Management/Social Work Plan    Plan SNF    Patient/Family In Agreement With Plan yes    Additional Comments Met with pt and family at bedside.  Pt resides in [facility] with  and adult son.  She has had some recent functional decline and is planning to pursue STR at [facility] upon DC.  Confirmed Pt has Medicare and [employer] supplemental insurance with Rx coverage.  CM will facilitate transfer to SNF when medically ready.        10/04/17 1623    Case Management/Social Work Plan    Plan SNF    Patient/Family In Agreement With Plan yes        Discharge Placement     No information found        Expected Discharge Date and Time     Expected Discharge Date Expected Discharge Time    Oct 6, 2017               Demographic Summary       10/04/17 1620    Referral Information    Admission Type inpatient    Referral Source admission list    Reason For Consult discharge planning    Record Reviewed history and physical     Contact Information    Permission Granted to Share Information With ;family/designee            Functional Status       10/04/17 1625    Functional Status Prior    Ambulation 1-->assistive equipment    Transferring 1-->assistive equipment    Toileting 1-->assistive equipment    Bathing 1-->assistive equipment    Dressing 2-->assistive person    Eating 0-->independent    Communication 0-->understands/communicates without difficulty    IADL    Medications assistive person    Meal Preparation assistive person    Housekeeping assistive person    Laundry assistive person    Shopping assistive person    Oral Care independent    Activity Tolerance    Usual Activity Tolerance moderate            Psychosocial     None            Abuse/Neglect     None            Legal     None            Substance Abuse     None            Patient Forms     None          Lluvia Marte

## 2017-10-04 NOTE — PLAN OF CARE
Problem: Patient Care Overview (Adult)  Goal: Plan of Care Review  Outcome: Ongoing (interventions implemented as appropriate)    10/04/17 0533   Coping/Psychosocial Response Interventions   Plan Of Care Reviewed With patient   Patient Care Overview   Progress no change   Outcome Evaluation   Outcome Summary/Follow up Plan Pt NPO-speech eval today. No complaints of pain or discomfort. Slept very well. Some confusion and forgetfullness at times. Alert and oriented to time, person, and place; not situation.          Problem: Fall Risk (Adult)  Goal: Identify Related Risk Factors and Signs and Symptoms  Outcome: Ongoing (interventions implemented as appropriate)  Goal: Absence of Falls  Outcome: Ongoing (interventions implemented as appropriate)    Problem: Older Inpatient, Acutely Ill (Adult)  Goal: Signs and Symptoms of Listed Potential Problems Will be Absent or Manageable (Older Inpatient, Acutely Ill)  Outcome: Ongoing (interventions implemented as appropriate)

## 2017-10-04 NOTE — PROGRESS NOTES
"[facility]  Progress Note       LOS: 1 day   Patient Care Team:  Damian Miller MD as PCP - General (General Practice)    Chief Complaint:  Follow up syncope    Subjective     Feels ok this AM. Still coughing. No SOB. Chest sore when she coughs, and tender to palpation from CPR. No new complaints        Review of Systems:   Pertinent positives in HPI, all others reviewed and negative.      Objective       Current Facility-Administered Medications:   •  cefepime (MAXIPIME) 2 g/100 mL 0.9% NS (mbp), 2 g, Intravenous, Q12H, WILDER Matthews, [facility]  •  dextrose (D50W) solution 25 g, 25 g, Intravenous, Q15 Min PRN, Jie Jimenez MD  •  dextrose (GLUTOSE) oral gel 15 g, 15 g, Oral, Q15 Min PRN, Jie Jimenez MD  •  enoxaparin (LOVENOX) syringe 40 mg, 40 mg, Subcutaneous, Daily, Jie Jimenez MD, 40 mg at 10/03/17 1424  •  glucagon (GLUCAGEN) injection 1 mg, 1 mg, Subcutaneous, Q15 Min PRN, Jie Jimenez MD  •  hydrALAZINE (APRESOLINE) injection 10 mg, 10 mg, Intravenous, Q6H PRN, REN Wilkins  •  insulin lispro (humaLOG) injection 0-7 Units, 0-7 Units, Subcutaneous, 4x Daily AC & at Bedtime, Jie Jimenez MD  •  Pharmacy Consult, , Does not apply, Continuous PRN, Jie Jimenez MD  •  sodium chloride 0.9 % flush 1-10 mL, 1-10 mL, Intravenous, PRN, Jie Jimenez MD    Vital Sign Min/Max for last 24 hours  Temp  Min: 97.1 °F (36.2 °C)  Max: 98.3 °F (36.8 °C)   BP  Min: 129/68  Max: 181/94   Pulse  Min: 84  Max: 100   Resp  Min: 18  Max: 18   SpO2  Min: 93 %  Max: 96 %   Flow (L/min)  Min: 3  Max: 3   Weight  Min: 158 lb 4.8 oz (71.8 kg)  Max: 166 lb (75.3 kg)     Flowsheet Rows         First Filed Value    Admission Height  66\" (167.6 cm) Documented at 10/03/2017 0915    Admission Weight  166 lb (75.3 kg) Documented at 10/03/2017 0915            Intake/Output Summary (Last 24 hours) at 10/04/17 0845  Last data filed at 10/04/17 0557   Gross per 24 hour   Intake              " 100 ml   Output              350 ml   Net             -250 ml       Physical Exam:     General Appearance:    Alert, cooperative, in no acute distress   Lungs:     CTA    Heart:    RRR NL S1 S2 S4   Chest Wall:    No abnormalities observed, tender to palpation.    Abdomen:     Normal bowel sounds, no masses, no organomegaly, soft        non-tender, non-distended, no guarding, no rebound                tenderness   Extremities:   Moves all extremities well, no edema, no cyanosis, no             redness   Pulses:   Pulses palpable and equal bilaterally   Skin:   No bleeding, bruising or rash        Results Review:     Results from last 7 days  Lab Units 10/03/17  1224   WBC 10*3/mm3 10.16   HEMOGLOBIN g/dL 11.1*   HEMATOCRIT % 33.9*   PLATELETS 10*3/mm3 234       Results from last 7 days  Lab Units 10/03/17  1224   SODIUM mmol/L 138   POTASSIUM mmol/L 4.3   CHLORIDE mmol/L 107   CO2 mmol/L 29.0   BUN mg/dL 13   CREATININE mg/dL 0.80   GLUCOSE mg/dL 102*                                Intake/Output Summary (Last 24 hours) at 10/04/17 0845  Last data filed at 10/04/17 0557   Gross per 24 hour   Intake              100 ml   Output              350 ml   Net             -250 ml       I personally viewed and interpreted the patient's EKG/Telemetry data    EKG: 10/3/17: NSR 83 bpm, LBBB    Telemetry: NSR    Ejection Fraction  No results found for: EF    Echo EF Estimated  No results found for: ECHOEFEST      Present on Admission:  • Syncope with collapse  • LBBB (left bundle branch block)  • Seizure disorder  • RENATA treated with BiPAP  • Acute respiratory failure with hypoxia (POA)  • Essential hypertension  • Malignant neoplasm of left lung s/p radiation    Assessment/Plan   1.  Recurrent Syncope, without injury:                        -  To date, no documented arrhythmias or pauses.  Patient wore an event monitor several months ago - we'll try to obtain results.                        -  Rule out sinus arrest.                         - Neuro to see if syncope due to sz disorder                            2.   Ventricular fibrillation                        -  No records available at time of transfer.  Try to obtain any telemetry strips of arrhythmia.                        -  Patient did receive CPR and shock ×1.                        -  Essentially normal coronary arteries, normal EF.     3.  Left bundle branch block     4.  Pneumonia                        -  Progressive cough over the last 6 months, worrisome for aspiration.                        -  CT chest at outside facility shows bilateral lobe pneumonia.  Treated with IV Merrem, IV Zyvox.     5.  Seizure disorder/syncope                        -  Diagnosed 20 years ago.  On chronic Tegretol. Will consult Neuro today.      6.  Essential hypertension                        -  Poorly controlled.  Adjust current medications.                        -  Monitor renal function.     7.  RENATA                        -  Continue with BiPAP     8.  Left lower lobe lung carcinoma                        -  Status post radiation ×5, March 2017.  First CT s/p radiation showed improvement.  Next CT scheduled for October 24.                        -  Followed by Dr. Duffy, [facility].      Consult Neuro, obtain records.     MAXINE Escobedo  10/04/17  8:45 AM        I, Jatin Jesus MD, personally performed the services face to face as described and documented by the above named individual. I have made any necessary edits and it is both accurate and complete 10/4/2017  6:02 PM

## 2017-10-04 NOTE — PLAN OF CARE
Problem: Patient Care Overview (Adult)  Goal: Plan of Care Review  Outcome: Ongoing (interventions implemented as appropriate)    10/04/17 1511   Coping/Psychosocial Response Interventions   Plan Of Care Reviewed With patient   Patient Care Overview   Progress progress toward functional goals as expected   Outcome Evaluation   Outcome Summary/Follow up Plan MBS complete. Mild pharyngeal dysphagia. Oral phase functional with adequate mastication and transit time. No pen/asp during this study even when pushed with thins, pudding, solid. Mild-mod pyriform and valleculae residue after the swallow w/ thins 2' decreased pharyngeal stripping and hyoid excursion. Pt with inconsistent spontaneous second swallow that cleared residue. SLP provided cues for second swallow x1. REC: regular diet and thin liquids, meds whole in thins or pureed. encourage a second swallow after sips of thins. dys tx.         Problem: Inpatient SLP  Goal: Dysphagia- Patient will safely consume diet as per recommendation with no signs/symptoms of aspiration  Outcome: Ongoing (interventions implemented as appropriate)    10/04/17 1511   Safely Consume Diet   Safely Consume Diet- SLP, Date Established 10/04/17   Safely Consume Diet- SLP, Time to Achieve by discharge   Safely Consume Diet- SLP, Date Goal Reviewed 10/04/17   Safely Consume Diet- SLP, Outcome goal ongoing

## 2017-10-04 NOTE — CONSULTS
Neurology    Referring Provider: MAXINE Ryder    Reason for Consultation: syncope      Chief complaint: syncope, Vfib arrest    Subjective .     History of present illness:  Ms. Beck is a pleasant 82-year-old female with a past medical history significant for Alzheimer's disease, diabetes mellitus, hypertension, reported seizure disorder who is admitted to the hospitalist service for syncope in the setting of V. Fib/V. tach arrest.  Per report she was sitting in her chair and all of a sudden became unresponsive with some witnessed jerking of her upper extremities followed by her falling over.  When EMS arrived they noticed that she was in V. fib or pulseless V. tach and had chest compressions and was defibrillated ×1.  She was successfully resuscitated and transferred here for further evaluation.  She does have a reported seizure disorder treated with carbamazepine 200 mg twice a day diagnosed several years ago.  Family reports that she has not had a seizure in a long time.    Review of Systems: Positive for syncope and convulsions.Otherwise complete review of systems was discussed with the patient and found to be negative except for that mentioned in history of present illness or in the initial H&P dated 10/03/2017    History  Past Medical History:   Diagnosis Date   • Alzheimer disease    • Diabetes mellitus    • Gout    • Hypertension    • Lung cancer    • Seizure    • Sleep apnea     Bipap HS   ,   Past Surgical History:   Procedure Laterality Date   • CATARACT EXTRACTION     • CERVICAL DISCECTOMY ANTERIOR     • HYSTERECTOMY     • JOINT REPLACEMENT      Bilateral Knees   , History reviewed. No pertinent family history.,   Social History   Substance Use Topics   • Smoking status: Never Smoker   • Smokeless tobacco: Never Used   • Alcohol use No   ,   Prescriptions Prior to Admission   Medication Sig Dispense Refill Last Dose   • allopurinol (ZYLOPRIM) 300 MG tablet Take 300 mg by mouth Daily.   10/2/2017  "at 0900   • aspirin 81 MG EC tablet Take 81 mg by mouth Daily.   10/2/2017 at 0900   • carBAMazepine XR (TEGRETOL-XR) 200 MG 12 hr tablet Take 200 mg by mouth 2 (Two) Times a Day.   10/2/2017 at 2100   • carvedilol (COREG) 3.125 MG tablet Take 3.125 mg by mouth 2 (Two) Times a Day With Meals.   10/2/2017 at 0900   • doxazosin (CARDURA) 2 MG tablet Take 2 mg by mouth Every Night.   Past Week at Unknown time   • glimepiride (AMARYL) 2 MG tablet Take 2 mg by mouth Every Morning Before Breakfast.   Past Week at Unknown time   • lisinopril (PRINIVIL,ZESTRIL) 5 MG tablet Take 5 mg by mouth Daily.   10/2/2017 at 0900   • memantine (NAMENDA) 5 MG tablet Take 5 mg by mouth 2 (Two) Times a Day.   10/2/2017 at 0900   • potassium citrate (UROCIT-K) 5 MEQ (540 MG) CR tablet Take  by mouth 3 (Three) Times a Day With Meals.   10/2/2017 at 0900   • torsemide (DEMADEX) 100 MG tablet Take 100 mg by mouth Daily.   Past Week at Unknown time   • vitamin D (ERGOCALCIFEROL) 79417 units capsule capsule Take 50,000 Units by mouth 1 (One) Time Per Week.   Past Week at Unknown time    and Allergies:  Quinolones; Vancomycin; Aluminum-containing compounds; Amoxicillin; Ciprofloxacin; Levofloxacin; Prednisone; Shrimp flavor; Cefazolin; Contrast dye; Diclofenac; Etodolac; Ibuprofen; Indomethacin; Ketorolac; Meclofenamate; Mefenamic acid; Meloxicam; Misoprostol; Naproxen; Oxaprozin; Piroxicam; and Sulindac    Objective     Vital Signs   Blood pressure 139/52, pulse 86, temperature 98.3 °F (36.8 °C), temperature source Oral, resp. rate 18, height 66\" (167.6 cm), weight 158 lb 4.8 oz (71.8 kg), SpO2 93 %.    Physical Exam:      Gen: Lying in bed with eyes open. In NAD. Appears stated age   Eyes: PERRL, conjuntivae/lids normal   ENT: External canals normal bilaterally. Oropharynx normal.    Neck: Supple. No thyroid enlargement noted   Respiratory: CTA bilaterally. Respirations unlabored   CV: RRR, S1 and S2 nml. Radial pulses 2+ bilaterally.    Skin: " No rashes noted on exposed skin. Normal tugor.   MSK: Normal bulk and tone. Nml ROM     Neurologic:   Mental status: Awake, alert, oriented x4. Follows commands. Speech fluent.    CN: PERRL, EOM intact, sensation intact in upper/mid/lower face bilaterally, facial movements symmetric, hearing intact to finger rub bilaterally, palate elevates symmetrically, tongue movements and SCMs strong bilaterally    Motor: Strength full (5/5) throughout in BUE and BLE    Reflexes: 2+ throughout.    Sensory: Intact to LT throughout   Coordination: No dysmetria noted   Gait: Not tested        Results Reviewed:     Labs reviewed  EKG report reviewed                 Assessment/Plan     1.  Syncope = likely of cardiac etiology given her a witnessed cardiac arrest.  The associated convulsions were likely manifestation of syncopal myoclonus versus possible provoked seizure.  Continue medical/cardiac workup and supportive care.    2.  Cardiac arrest = s/p resuscitation. cardiology following    3.  Seizure disorder = on carbamazepine 200 mg twice a day.  Check carbamazepine level    4.  Alzheimer's disease = continue memantine 5 mg twice a day.  Recommend outpatient neuro follow-up (family prefers to see Dr. Yenni Aguilera in [place]).    5.  Dysphagia = SLP following.  We will get an MRI brain without contrast (patient with documented contrast allergy) to evaluate for any intracranial/brain stem abnormalities        Maria Elena Jung MD  10/04/17  2:05 PM

## 2017-10-05 LAB
GLUCOSE BLDC GLUCOMTR-MCNC: 106 MG/DL (ref 70–130)
GLUCOSE BLDC GLUCOMTR-MCNC: 125 MG/DL (ref 70–130)
GLUCOSE BLDC GLUCOMTR-MCNC: 144 MG/DL (ref 70–130)
GLUCOSE BLDC GLUCOMTR-MCNC: 153 MG/DL (ref 70–130)

## 2017-10-05 PROCEDURE — 97162 PT EVAL MOD COMPLEX 30 MIN: CPT

## 2017-10-05 PROCEDURE — 90674 CCIIV4 VAC NO PRSV 0.5 ML IM: CPT | Performed by: INTERNAL MEDICINE

## 2017-10-05 PROCEDURE — 25010000002 CEFEPIME

## 2017-10-05 PROCEDURE — 25010000002 INFLUENZA VAC SPLIT QUAD 0.5 ML SUSPENSION PREFILLED SYRINGE: Performed by: INTERNAL MEDICINE

## 2017-10-05 PROCEDURE — 92526 ORAL FUNCTION THERAPY: CPT

## 2017-10-05 PROCEDURE — 25010000002 ENOXAPARIN PER 10 MG: Performed by: INTERNAL MEDICINE

## 2017-10-05 PROCEDURE — 99232 SBSQ HOSP IP/OBS MODERATE 35: CPT | Performed by: INTERNAL MEDICINE

## 2017-10-05 PROCEDURE — 99232 SBSQ HOSP IP/OBS MODERATE 35: CPT | Performed by: PSYCHIATRY & NEUROLOGY

## 2017-10-05 PROCEDURE — 82962 GLUCOSE BLOOD TEST: CPT

## 2017-10-05 PROCEDURE — 97165 OT EVAL LOW COMPLEX 30 MIN: CPT

## 2017-10-05 PROCEDURE — 63710000001 INSULIN LISPRO (HUMAN) PER 5 UNITS: Performed by: INTERNAL MEDICINE

## 2017-10-05 PROCEDURE — G0008 ADMIN INFLUENZA VIRUS VAC: HCPCS | Performed by: INTERNAL MEDICINE

## 2017-10-05 RX ADMIN — CARBAMAZEPINE 200 MG: 200 TABLET ORAL at 22:23

## 2017-10-05 RX ADMIN — INSULIN LISPRO 2 UNITS: 100 INJECTION, SOLUTION INTRAVENOUS; SUBCUTANEOUS at 22:23

## 2017-10-05 RX ADMIN — ENOXAPARIN SODIUM 40 MG: 40 INJECTION SUBCUTANEOUS at 10:23

## 2017-10-05 RX ADMIN — CARBAMAZEPINE 200 MG: 200 TABLET ORAL at 10:21

## 2017-10-05 RX ADMIN — CEFEPIME 2 G: 2 INJECTION, POWDER, FOR SOLUTION INTRAVENOUS at 17:22

## 2017-10-05 RX ADMIN — INFLUENZA VIRUS VACCINE 0.5 ML: 15; 15; 15; 15 SUSPENSION INTRAMUSCULAR at 10:22

## 2017-10-05 RX ADMIN — CEFEPIME 2 G: 2 INJECTION, POWDER, FOR SOLUTION INTRAVENOUS at 05:10

## 2017-10-05 NOTE — THERAPY EVALUATION
Acute Care - Physical Therapy Initial Evaluation  [facility]     Patient Name: Sarah Beck  : 1935  MRN: [id number]  Today's Date: 10/5/2017   Onset of Illness/Injury or Date of Surgery Date: 10/03/17  Date of Referral to PT: 10/04/17  Referring Physician: MD GRUPO      Admit Date: 10/3/2017     Visit Dx:    ICD-10-CM ICD-9-CM   1. Pharyngeal dysphagia R13.13 787.23   2. Impaired mobility and ADLs Z74.09 799.89   3. Impaired functional mobility, balance, gait, and endurance Z74.09 V49.89     Patient Active Problem List   Diagnosis   • Syncope with collapse   • LBBB (left bundle branch block)   • Seizure disorder   • RENATA treated with BiPAP   • Acute respiratory failure with hypoxia (POA)   • Essential hypertension   • Malignant neoplasm of left lung s/p radiation   • Bilateral pneumonia     Past Medical History:   Diagnosis Date   • Alzheimer disease    • Diabetes mellitus    • Gout    • Hypertension    • Lung cancer    • Seizure    • Sleep apnea     Bipap HS     Past Surgical History:   Procedure Laterality Date   • CATARACT EXTRACTION     • CERVICAL DISCECTOMY ANTERIOR     • HYSTERECTOMY     • JOINT REPLACEMENT      Bilateral Knees          PT ASSESSMENT (last 72 hours)      PT Evaluation       10/05/17 1300 10/05/17 1000    Rehab Evaluation    Document Type therapy note (daily note)  -HG evaluation   COMPLETED CHART REVIEW [id number]. CO-RX WITH O.T.   -CD    Subjective Information agree to therapy  -HG agree to therapy;no complaints   START TIME AT 0930  -CD    Patient Effort, Rehab Treatment excellent  -HG excellent  -CD    General Information    Patient Profile Review  yes  -CD    Onset of Illness/Injury or Date of Surgery Date  10/03/17  -CD    Referring Physician  MD GRUPO  -CD    General Observations  PT SITTING UIC UPON ARRIVAL WITH DTR AND  PRESENT.   -CD    Pertinent History Of Current Problem  PT TO OSH  AFTER CODING , WAS INTUBATED AND IN ICU. HAD HEART CATH AND CT OF CHECK  CONCERNING FOR PNA. EKG REVEALED L BBB. PT WAS TF'D TO [facility] FOR POSSIBLE WIRELESS PACEMAKER PLACEMENT.   -CD    Precautions/Limitations  fall precautions;oxygen therapy device and L/min   RECENT CHEST COMPRESSIONS- SORE.   -CD    Prior Level of Function  independent:;all household mobility;community mobility;ADL's;dependent:;driving  -CD    Equipment Currently Used at Home  cane, straight;rollator;shower chair;grab bar;bipap/ cpap;raised toilet  -CD    Plans/Goals Discussed With  patient and family;agreed upon  -CD    Risks Reviewed  patient and family:;LOB;increased discomfort;change in vital signs  -CD    Benefits Reviewed  patient and family:;improve function;increase independence;increase strength;increase balance;increase knowledge  -CD    Barriers to Rehab  medically complex  -CD    Living Environment    Lives With  spouse;child(froylan), adult  -CD    Living Arrangements  house  -CD    Living Environment Comment  PLANS TO GO TO SNF FOR REHAB.   -CD    Clinical Impression    Date of Referral to PT  10/04/17  -CD    PT Diagnosis  IMPAIRED FUNCTIONAL MOBILITY, SYNCOPE.   -CD    Patient/Family Goals Statement  TO GO TO SNF FOR REHAB PRIOR TO HOME.   -CD    Criteria for Skilled Therapeutic Interventions Met  yes  -CD    Rehab Potential  good, to achieve stated therapy goals  -CD    Vital Signs    Pre Systolic BP Rehab  --   VITALS PER O.T. NOTE.   -CD    Pain Assessment    Pain Assessment 0-10  -HG 0-10  -CD    Pain Score 10  -HG 4  -CD    Post Pain Score  4  -CD    Pain Type Acute pain  -HG Acute pain;Other (Comment)  -CD    Pain Location Chest   chest compressions from code on prior admit  -HG Chest   PT HAD RECENT CHEST COMPRESSIONS DURING CODE PRIOR TO ADMIT.  -CD    Pain Intervention(s)  Repositioned  -CD    Response to Interventions  TOLERATED.   -CD    Vision Assessment/Intervention    Visual Impairment  WFL with corrective lenses  -CD    Cognitive Assessment/Intervention    Current Cognitive/Communication  Assessment  impaired  -CD    Orientation Status  person;time   CUES FOR PLACE. [facility].   -CD    Follows Commands/Answers Questions  100% of the time;needs cueing;needs repetition;needs increased time;able to follow single-step instructions  -CD    Personal Safety  mild impairment;decreased awareness, need for assist  -CD    Personal Safety Interventions  fall prevention program maintained  -CD    ROM (Range of Motion)    General ROM  no range of motion deficits identified   B LE'S   -CD    MMT (Manual Muscle Testing)    General MMT Assessment  lower extremity strength deficits identified  -CD    General MMT Assessment Detail  GROSSLY 4/5 B LE'S   -CD    Bed Mobility, Assessment/Treatment    Bed Mobility, Comment  PT UIC.   -CD    Transfer Assessment/Treatment    Transfers, Sit-Stand [place]  contact guard assist;2 person assist required  -CD    Transfers, Stand-Sit [place]  verbal cues required;contact guard assist  -CD    Transfers, Sit-Stand-Sit, Assist Device  rolling walker  -CD    Transfer, Impairments  strength decreased;impaired balance  -CD    Transfer, Comment  CUES FOR HAND PLACEMENT.   -CD    Gait Assessment/Treatment    Gait, [place] Level  minimum assist (75% patient effort)  -CD    Gait, Assistive Device  rolling walker  -CD    Gait, Distance (Feet)  90  -CD    Gait, Gait Deviations  forward flexed posture;step length decreased;amor decreased  -CD    Gait, Safety Issues  step length decreased;balance decreased during turns  -CD    Gait, Impairments  impaired balance;strength decreased  -CD    Gait, Comment  MANUAL ASSIST TO NEGOTIATE TURNS WITH WALKER PRN.   -CD    Positioning and Restraints    Pre-Treatment Position  sitting in chair/recliner  -CD    Post Treatment Position  chair  -CD    In Chair  reclined;call light within reach;notified nsg;encouraged to call for assist;with family/caregiver;legs elevated;RUE elevated;LUE elevated  -CD      10/05/17 0932 10/04/17 1625     Rehab Evaluation    Document Type evaluation  -AN     Subjective Information agree to therapy;complains of;pain  -AN     Patient Effort, Rehab Treatment good  -AN     Symptoms Noted During/After Treatment fatigue;significant change in vital signs  -AN     Symptoms Noted Comment see vitals, notified RN  -AN     General Information    Patient Profile Review yes  -AN     Onset of Illness/Injury or Date of Surgery Date 10/03/17  -AN     Referring Physician MD Tony  -AN     General Observations Pt reclined in chair, daughter and spouse present. IV intace, O2NC  -AN     Pertinent History Of Current Problem Pt had cardiac arrest 9/26/17 at home, son performed CPR, EMS to OSH. Heart cath , ICU, intubated/extubated then sent to [facility] for higher level of care.  Pt with recent lung CA dx,. Pt also with decreased memory, activity and other episode of syncope this year.  -AN     Precautions/Limitations fall precautions;cardiac precautions  -AN     Prior Level of Function independent:;ADL's;mod assist:;home management;cooking;dependent:;driving  -AN     Equipment Currently Used at Home cane, quad;raised toilet;bath bench;bipap/ cpap;grab bar;rollator  -AN rollator;cane, straight;shower chair;grab bar;bipap/ cpap;raised toilet  -SG    Plans/Goals Discussed With patient and family;agreed upon  -AN     Risks Reviewed patient and family:;LOB;dizziness;increased discomfort;change in vital signs  -AN     Benefits Reviewed patient and family:;improve function;increase independence;increase strength;increase balance  -AN     Barriers to Rehab medically complex;previous functional deficit  -AN     Living Environment    Lives With spouse;child(froylan), adult  -AN spouse;child(froylan), adult  -SG    Living Arrangements house  -AN     Home Accessibility tub/shower is not walk in  -AN     Transportation Available  car;family or friend will provide  -SG    Living Environment Comment pt has TTB  -AN     Vital Signs    Pre Systolic BP Rehab 156  -AN      Pre Treatment Diastolic BP 91  -AN     Post Systolic BP Rehab 131  -AN     Post Treatment Diastolic BP 55  -AN     Pretreatment Heart Rate (beats/min) 99  -AN     Posttreatment Heart Rate (beats/min) 108  -AN     Pre SpO2 (%) 95  -AN     O2 Delivery Pre Treatment supplemental O2  -AN     Post SpO2 (%) 95  -AN     O2 Delivery Post Treatment supplemental O2  -AN     Pain Assessment    Pain Assessment 0-10  -AN     Pain Score 4  -AN     Post Pain Score 4  -AN     Pain Type Acute pain;Other (Comment)  -AN     Pain Location Chest  -AN     Pain Onset Other (Comment)   since awakening from chest compressions  -AN     Pain Intervention(s) Repositioned  -AN     Response to Interventions tolerated  -AN     Vision Assessment/Intervention    Visual Impairment WFL with corrective lenses  -AN     Cognitive Assessment/Intervention    Current Cognitive/Communication Assessment impaired  -AN     Orientation Status oriented to;person;time;situation;place;required verbal cueing (specifiy in comments)   cues for place  -AN     Follows Commands/Answers Questions 100% of the time;needs cueing;needs increased time;needs repetition  -AN     Personal Safety mild impairment;decreased awareness, need for assist  -AN     Personal Safety Interventions fall prevention program maintained  -AN     ROM (Range of Motion)    General ROM upper extremity range of motion deficits identified  -AN     General ROM Detail hx R shoulder injury 25% elevation; elbow , wrist, hand and L UE WFL  -AN     MMT (Manual Muscle Testing)    General MMT Assessment Detail R shoulder 2/5; remaining joints grossly 3+/5  -AN     Bed Mobility, Assessment/Treatment    Bed Mobility, Comment pt up in chair  -AN     Transfer Assessment/Treatment    Transfers, Sit-Stand [place] contact guard assist;2 person assist required;verbal cues required  -AN     Transfers, Stand-Sit [place] supervision required;contact guard assist  -AN     Transfers, Sit-Stand-Sit, Assist  Device rolling walker  -AN     Transfer, Impairments strength decreased;impaired balance  -AN     Motor Skills/Interventions    Additional Documentation Balance Skills Training (Group);Fine Motor Coordination Training (Group);Gross Motor Coordination Training (Group)  -AN     Balance Skills Training    Sitting-Level of Assistance Close supervision  -AN     Standing-Level of Assistance Contact guard  -AN     Static Standing Balance Support assistive device  -AN     Standing-Balance Activities Weight Shift R-L  -AN     Fine Motor Coordination Training    Detail (Fine Motor Coordination Training) WFL  -AN     Gross Motor Coordination Training    Gross Motor Skill, Impairments Detail WFL  -AN     Positioning and Restraints    Pre-Treatment Position sitting in chair/recliner  -AN     Post Treatment Position chair  -AN     In Chair reclined;call light within reach;encouraged to call for assist;exit alarm on;with family/caregiver  -AN       10/04/17 1430 10/04/17 0900    Rehab Evaluation    Document Type evaluation  -LS evaluation  -LS (r) AS (t) LS (c)    Subjective Information no complaints;agree to therapy  -LS no complaints;agree to therapy  -LS (r) AS (t) LS (c)    Pain Assessment    Pain Assessment No/denies pain  -LS No/denies pain  -LS (r) AS (t) LS (c)      10/03/17 2000 10/03/17 1000    General Information    Equipment Currently Used at Home  walker, rolling  -DILSHAD    Living Environment    Lives With spouse;child(froylan), adult  -BM     Living Arrangements house  -BM     Home Accessibility no concerns  -BM     Stair Railings at Home none  -BM     Type of Financial/Environmental Concern none  -BM     Transportation Available family or friend will provide  -BM       User Key  (r) = Recorded By, (t) = Taken By, (c) = Cosigned By    Initials Name Provider Type    JERRICA Gomez, PT Physical Therapist    GIOVANNY Mcnamara, OT Occupational Therapist    NELLI Hernandez, MS CCC-SLP Speech and Language Pathologist    SG  Lluvia Marte     DILSHAD Melony Clemens, RN Registered Nurse     Yanet Ruano, MS [facility]-SLP Speech and Language Pathologist    BM Bárbara Sibley, RN Registered Nurse    AS Laurel Mcgill, Speech Therapy Student Speech Therapy Student          Physical Therapy Education     Title: PT OT SLP Therapies (Active)     Topic: Physical Therapy (Done)     Point: Mobility training (Done)    Learning Progress Summary    Learner Readiness Method Response Comment Documented by Status   Patient Acceptance E VU,NR SAFETY WITH MOBILITY, BENEFITS OF OOB ACTIVITY, PROGRESSION OF POC, D/C PLANNING. CD 10/05/17 1429 Done   Family Acceptance E VU,NR SAFETY WITH MOBILITY, BENEFITS OF OOB ACTIVITY, PROGRESSION OF POC, D/C PLANNING. CD 10/05/17 1429 Done               Point: Home exercise program (Done)    Learning Progress Summary    Learner Readiness Method Response Comment Documented by Status   Patient Acceptance E VU,NR SAFETY WITH MOBILITY, BENEFITS OF OOB ACTIVITY, PROGRESSION OF POC, D/C PLANNING.  10/05/17 1429 Done   Family Acceptance E VU,NR SAFETY WITH MOBILITY, BENEFITS OF OOB ACTIVITY, PROGRESSION OF POC, D/C PLANNING.  10/05/17 1429 Done               Point: Body mechanics (Done)    Learning Progress Summary    Learner Readiness Method Response Comment Documented by Status   Patient Acceptance E VU,NR SAFETY WITH MOBILITY, BENEFITS OF OOB ACTIVITY, PROGRESSION OF POC, D/C PLANNING.  10/05/17 1429 Done   Family Acceptance E VU,NR SAFETY WITH MOBILITY, BENEFITS OF OOB ACTIVITY, PROGRESSION OF POC, D/C PLANNING.  10/05/17 1429 Done               Point: Precautions (Done)    Learning Progress Summary    Learner Readiness Method Response Comment Documented by Status   Patient Acceptance E VU,NR SAFETY WITH MOBILITY, BENEFITS OF OOB ACTIVITY, PROGRESSION OF POC, D/C PLANNING.  10/05/17 1429 Done   Family Acceptance E VU,NR SAFETY WITH MOBILITY, BENEFITS OF OOB ACTIVITY, PROGRESSION OF POC, D/C PLANNING.  CD 10/05/17 1429 Done                      User Key     Initials Effective Dates Name Provider Type Discipline    CD 06/19/15 -  Alisha Gomez, PT Physical Therapist PT                PT Recommendation and Plan  Planned Therapy Interventions: balance training, bed mobility training, gait training, home exercise program, strengthening, transfer training  PT Frequency: daily  Plan of Care Review  Plan Of Care Reviewed With: patient  Outcome Summary/Follow up Plan: PT PRESENTS WITH EVOLVING SYMPTOMS TO INCLUDE GENERALIZED WEAKNESS, UNSTEADY GAIT AND DECLINE IN FUNCTIONAL MOBILITY. WILL BENEFIT FROM P.T. TO INCREASE INDEPENDENCE/SAFETY WITH MOBILITY. C/O CHEST PAIN FROM PRIOR CHEST COMPRESSIONS. RECOMMEND SNF AT D/C.           IP PT Goals       10/05/17 1430          Bed Mobility PT LTG    Bed Mobility PT LTG, Time to Achieve 2 wks  -CD      Bed Mobility PT LTG, Activity Type all bed mobility  -CD      Bed Mobility PT LTG, [place] Level contact guard assist  -CD      Transfer Training PT LTG    Transfer Training PT LTG, Activity Type all transfers  -CD      Transfer Training PT LTG, [place] Level supervision required  -CD      Transfer Training PT LTG, Assist Device walker, rolling  -CD      Gait Training PT LTG    Gait Training Goal PT LTG, Time to Achieve 2 wks  -CD      Gait Training Goal PT LTG, [place] Level supervision required  -CD      Gait Training Goal PT LTG, Assist Device walker, rolling  -CD      Gait Training Goal PT LTG, Distance to Achieve 350  -CD        User Key  (r) = Recorded By, (t) = Taken By, (c) = Cosigned By    Initials Name Provider Type    CD Alisha Gomez, PT Physical Therapist                Outcome Measures       10/05/17 0932 10/05/17 0930       How much help from another person do you currently need...    Turning from your back to your side while in flat bed without using bedrails?  2  -CD     Moving from lying on back to sitting on the side of a flat bed without bedrails?   2  -CD     Moving to and from a bed to a chair (including a wheelchair)?  3  -CD     Standing up from a chair using your arms (e.g., wheelchair, bedside chair)?  3  -CD     Climbing 3-5 steps with a railing?  2  -CD     To walk in hospital room?  3  -CD     AM-PAC 6 Clicks Score  15  -CD     How much help from another is currently needed...    Putting on and taking off regular lower body clothing? 2  -AN      Bathing (including washing, rinsing, and drying) 2  -AN      Toileting (which includes using toilet bed pan or urinal) 3  -AN      Putting on and taking off regular upper body clothing 3  -AN      Taking care of personal grooming (such as brushing teeth) 3  -AN      Eating meals 4  -AN      Score 17  -AN      Functional Assessment    Outcome Measure Options AM-PAC 6 Clicks Daily Activity (OT)  -AN AM-PAC 6 Clicks Basic Mobility (PT)  -CD       User Key  (r) = Recorded By, (t) = Taken By, (c) = Cosigned By    Initials Name Provider Type    JERRICA Gomez, PT Physical Therapist    GIOVANNY Mcnamara, OT Occupational Therapist           Time Calculation:         PT Charges       10/05/17 1433          Time Calculation    Start Time 0930  -CD      PT Received On 10/05/17  -CD      PT Goal Re-Cert Due Date 10/15/17  -CD        User Key  (r) = Recorded By, (t) = Taken By, (c) = Cosigned By    Initials Name Provider Type    JERRICA Gomez, PT Physical Therapist          Therapy Charges for Today     Code Description Service Date Service Provider Modifiers Qty    18434755412 HC PT EVAL MOD COMPLEXITY 4 10/5/2017 Alisha Gomez, PT GP 1          PT G-Codes  Outcome Measure Options: AM-PAC 6 Clicks Daily Activity (OT)      Alisha Gomez, PT  10/5/2017

## 2017-10-05 NOTE — PLAN OF CARE
Problem: Patient Care Overview (Adult)  Goal: Plan of Care Review  Outcome: Ongoing (interventions implemented as appropriate)    10/05/17 1430   Coping/Psychosocial Response Interventions   Plan Of Care Reviewed With patient   Outcome Evaluation   Outcome Summary/Follow up Plan PT PRESENTS WITH EVOLVING SYMPTOMS TO INCLUDE GENERALIZED WEAKNESS, UNSTEADY GAIT AND DECLINE IN FUNCTIONAL MOBILITY. WILL BENEFIT FROM P.T. TO INCREASE INDEPENDENCE/SAFETY WITH MOBILITY. C/O CHEST PAIN FROM PRIOR CHEST COMPRESSIONS. RECOMMEND SNF AT D/C.          Problem: Inpatient Physical Therapy  Goal: Bed Mobility Goal LTG- PT  Outcome: Ongoing (interventions implemented as appropriate)    10/05/17 1430   Bed Mobility PT LTG   Bed Mobility PT LTG, Time to Achieve 2 wks   Bed Mobility PT LTG, Activity Type all bed mobility   Bed Mobility PT LTG, [place] Level contact guard assist       Goal: Transfer Training Goal 1 LTG- PT  Outcome: Ongoing (interventions implemented as appropriate)    10/05/17 1430   Transfer Training PT LTG   Transfer Training PT LTG, Activity Type all transfers   Transfer Training PT LTG, [place] Level supervision required   Transfer Training PT LTG, Assist Device walker, rolling       Goal: Gait Training Goal LTG- PT  Outcome: Ongoing (interventions implemented as appropriate)    10/05/17 1430   Gait Training PT LTG   Gait Training Goal PT LTG, Time to Achieve 2 wks   Gait Training Goal PT LTG, [place] Level supervision required   Gait Training Goal PT LTG, Assist Device walker, rolling   Gait Training Goal PT LTG, Distance to Achieve 350

## 2017-10-05 NOTE — PLAN OF CARE
Problem: Patient Care Overview (Adult)  Goal: Plan of Care Review  Outcome: Ongoing (interventions implemented as appropriate)    10/05/17 1313   Coping/Psychosocial Response Interventions   Plan Of Care Reviewed With patient;family   Outcome Evaluation   Outcome Summary/Follow up Plan Pt seen for dysphagia tx this date with family present. Pt tolerating current diet and liquids but feeling weak this date and pain in chest from chest compressions given from previous code prior to this admit. Pt completed swallowing exercises x 2 each with 50% accuracy. Exercises left for pt- cont with dysphagia tx x 1 and then D/C from skilled services.          Problem: Inpatient SLP  Goal: Dysphagia- Patient will safely consume diet as per recommendation with no signs/symptoms of aspiration  Outcome: Ongoing (interventions implemented as appropriate)    10/05/17 1313   Safely Consume Diet   Safely Consume Diet- SLP, Date Established 10/05/17   Safely Consume Diet- SLP, Outcome goal ongoing

## 2017-10-05 NOTE — PLAN OF CARE
Problem: Patient Care Overview (Adult)  Goal: Plan of Care Review  Outcome: Ongoing (interventions implemented as appropriate)    10/05/17 1019   Coping/Psychosocial Response Interventions   Plan Of Care Reviewed With patient   Outcome Evaluation   Outcome Summary/Follow up Plan Pt. CGA with mobility and transfers, but fatigues easily. Pt has complex PMH that will slow progress to PLOF with ADL's. Recommend SNF at discharge. Pt is agreeable, willing to participate in therapy.          Problem: Inpatient Occupational Therapy  Goal: Transfer Training Goal 1 LTG- OT  Outcome: Ongoing (interventions implemented as appropriate)    10/05/17 1019   Transfer Training OT LTG   Transfer Training OT LTG, Date Established 10/05/17   Transfer Training OT LTG, Time to Achieve 1 wk   Transfer Training OT LTG, Activity Type all transfers   Transfer Training OT LTG, [place] Level contact guard assist   Transfer Training OT LTG, Additional Goal with AD/DME as needed.       Goal: Range of Motion Goal LTG- OT  Outcome: Ongoing (interventions implemented as appropriate)    10/05/17 1019   Range of Motion OT LTG   Range of Motion Goal OT LTG, Date Established 10/05/17   Range of Motion Goal OT LTG, Time to Achieve 1 wk   Range of Motion Goal OT LTG, Additional Goal Pt will participate in daily UE ex program to increase functional ROM, strength and endurance.       Goal: LB Dressing Goal LTG- OT  Outcome: Ongoing (interventions implemented as appropriate)    10/05/17 1019   LB Dressing OT LTG   LB Dressing Goal OT LTG, Date Established 10/05/17   LB Dressing Goal OT LTG, Time to Achieve 1 wk   LB Dressing Goal OT LTG, [place] Level minimum assist (75% patient effort)       Goal: Activity Tolerance Goal LTG- OT  Outcome: Ongoing (interventions implemented as appropriate)    10/05/17 1019   Activity Tolerance OT LTG   Activity Tolerance Goal OT LTG, Date Established 10/05/17   Activity Tolerance Goal OT LTG, Time to Achieve 1 wk    Activity Tolerance Goal OT LTG, Activity Level 10 min activity   Activity Tolerance Goal OT LTG, Additional Goal with one rest break to increase functional endurance for ADLs.

## 2017-10-05 NOTE — DISCHARGE PLACEMENT REQUEST
"JOSE BURNETT, RN    [phone]          Armando Beck (82 y.o. Female)     Date of Birth Social Security Number Address Home Phone MRN    [id number]  [id number] [facility] [phone] [id number]    Buddhist Marital Status          Advent        Admission Date Admission Type Admitting Provider Attending Provider Department, Room/Bed    10/3/17 Elective Jie Jimenez MD Opii, [place], MD [address], S[address]/1    Discharge Date Discharge Disposition Discharge Destination                      Attending Provider: Jie Jimenez MD     Allergies:  Quinolones, Vancomycin, Aluminum-containing Compounds, Amoxicillin, Ciprofloxacin, Levofloxacin, Prednisone, Shrimp Flavor, Cefazolin, Contrast Dye, Diclofenac, Etodolac, Ibuprofen, Indomethacin, Ketorolac, Meclofenamate, Mefenamic Acid, Meloxicam, Misoprostol, Naproxen, Oxaprozin, Piroxicam, Sulindac    Isolation:  None   Infection:  None   Code Status:  FULL    Ht:  66\" (167.6 cm)   Wt:  166 lb 9.6 oz (75.6 kg)    Admission Cmt:  None   Principal Problem:  Syncope with collapse [R55]                 Active Insurance as of 10/3/2017     Primary Coverage     Payor Plan Insurance Group Employer/Plan Group    MEDICARE MEDICARE A & B      Payor Plan Address Payor Plan Phone Number Effective From Effective To    [address] [phone] 5/1/2000     [place] [address]       Subscriber Name Subscriber Birth Date Member ID       ARMANDO BECK 1935 [id number]           Secondary Coverage     Payor Plan Insurance Group Employer/Plan Group    AAR MED SUPP [facility] HEALTH CARE OPTIONS      Payor Plan Address Payor Plan Phone Number Effective From Effective To    [facility] [phone] 1/1/2017     [id number]       [place] [address]       Subscriber Name Subscriber Birth Date Member ID       ARMANDO BECK 1935 [id number]                 Emergency Contacts      (Rel.) Home " Phone Work Phone Mobile Phone    John Beck (Spouse) -- -- [phone]    Jessica Wallace (Other) -- -- [phone]               History & Physical      Jie Jimenez MD at 10/3/2017 11:24 AM              [facility]  HISTORY AND PHYSICAL    Primary Care Physician: Damian Miller MD    Subjective     Chief Complaint:  transfer for syncope s/p code    History of Present Illness:   Ms Beck is a pleasant 82 yof with a hx of CVA with residual generalized weakness, well controlled diabetes A1C 5.8%, RENATA on bipap, hx pf lung CA s/p radiation followed at , seizure disorder on tergretol, two years of syncopal episodes. She present from OSH after presenting there 9/26 s/p coding. Per report patient was seating on her chair, passed out had some jerks and was unresponsive requiring chest compressions.On arrival of EMS she was found to be in Vfib/Vtach and was shocked x1. She was intubated and transfered to OSH ICU. She subsequently underwent a LHC that showed clean coronaries and normal EF, she was extubated, CT chest was concerning for pna and was started on abx. Her EKG showed LBBB. The etiology of her syncope is unknown, she was transferred here for likely leadless pacemaker placement. On arrival here patient was hemodynamically stable, on 3LO2, she was awake and alert with some confusion, did endorse chest discomfort from the compression.      Review of Systems   Otherwise complete 10 system ROS performed and negative except as mentioned in the HPI.    Past Medical History:   Diagnosis Date   • Alzheimer disease    • Diabetes mellitus    • Gout    • Hypertension        Past Surgical History:   Procedure Laterality Date   • CATARACT EXTRACTION     • CERVICAL DISCECTOMY ANTERIOR     • HYSTERECTOMY     • JOINT REPLACEMENT      Bilateral Knees       History reviewed. No pertinent family history.    Social History     Social History   • Marital status:      Spouse  "name: N/A   • Number of children: N/A   • Years of education: N/A     Occupational History   • Not on file.     Social History Main Topics   • Smoking status: Never Smoker   • Smokeless tobacco: Never Used   • Alcohol use No   • Drug use: No   • Sexual activity: Defer     Other Topics Concern   • Not on file     Social History Narrative   • No narrative on file       Medications:  No prescriptions prior to admission.       Allergies:  Allergies   Allergen Reactions   • Quinolones Anaphylaxis   • Vancomycin Anaphylaxis   • Aluminum-Containing Compounds Swelling and Rash     Aluminum Hydroxide   • Amoxicillin Rash   • Ciprofloxacin Other (See Comments)     Unknown Reaction   • Levofloxacin Other (See Comments)     Unknown Reaction   • Prednisone Other (See Comments)     Unknown Reaction   • Shrimp Flavor Other (See Comments)     Unknown Reaction   • Cefazolin Diarrhea   • Contrast Dye Rash   • Diclofenac Swelling and Rash   • Etodolac Swelling and Rash   • Ibuprofen Swelling and Rash   • Indomethacin Swelling and Rash   • Ketorolac Swelling and Rash   • Meclofenamate Swelling and Rash   • Mefenamic Acid Swelling and Rash   • Meloxicam Swelling and Rash   • Misoprostol Swelling and Rash   • Naproxen Swelling and Rash   • Oxaprozin Swelling and Rash   • Piroxicam Swelling and Rash   • Sulindac Swelling and Rash         Objective     Physical Exam:  Vital Signs: /81 (BP Location: Left arm, Patient Position: Lying)  Pulse 84  Temp 97.1 °F (36.2 °C) (Temporal Artery )   Resp 18  Ht 66\" (167.6 cm)  Wt 166 lb (75.3 kg)  SpO2 96%  BMI 26.79 kg/m2  Physical Exam    Constitutional: No acute distress, awake, alert  Eyes: PERRLA, sclerae anicteric, no conjunctival injection  HENT: NCAT, mucous membranes moist  Neck: Supple, no thyromegaly, no lymphadenopathy, trachea midline  Respiratory: Clear to auscultation bilaterally, nonlabored respirations   Cardiovascular: RRR, no murmurs, rubs, or gallops, palpable pedal " pulses bilaterally  Gastrointestinal: Positive bowel sounds, soft, nontender, nondistended  Musculoskeletal: No bilateral ankle edema, no clubbing or cyanosis to bilateral lower extremities  Psychiatric: Oriented x 3, appropriate affect, cooperative  Neurologic: Strength symmetric in all extremities, Cranial Nerves grossly intact to confrontation, speech clear  Skin: No rashes    Results Reviewed:    I have personally reviewed and interpreted available lab data, radiology studies and ECG obtained at time of admission.     Assessment / Plan     Problem List:   Hospital Problem List     * (Principal)Syncope with collapse    LBBB (left bundle branch block)    Seizure disorder    RENATA treated with BiPAP    Acute respiratory failure with hypoxia (POA)    Essential hypertension    Malignant neoplasm of left lung s/p radiation    Bilateral pneumonia          Assessment:  82 yof with a hx of CVA with residual generalized weakness, well controlled diabetes A1C 5.8%, RENATA on bipap, hx pf lung CA s/p radiation followed at , seizure disorder and two years of syncopal episodes, she was transferred here for possible leadless pacemaker placement    Plan:  - Hx of syncope with collapse, multiple episodes in the past, etiology unknown, extensive w/u (data deficient) unrevealing, suspect PE with underlying hx of lung malignancy, BNP elevated at OSH remains elevated will get CTA pre-treat before.  - Patient with LBBB, s/p VT/Vfib, LHC with clean coronaries and normal EF, cardiology following.  - Bilateral LL consolidation and opacifications, concerns for aspiration pna, will start cefepime  - consult SLP to evaluate for aspiration, keep NPO for now  - Hx of lung CA s/p radiation x5, no chemo followed at , get records  - Hx of sz disorder, continue home rx  - Hx of well controlled DM A1C 5.8 continue SSI  - Hx of RENATA continue home bipap    DVT prophylaxis: Lovenox    Code Status: Full    Admission Status: Patient will be admitted to  INPATIENT status due to the need for care which can only be reasonably provided in an hospital setting such as aggressive/expedited ancillary services and/or consultation services, the necessity for IV medications, close physician monitoring and/or the possible need for procedures.  In such, I feel patient’s risk for adverse outcomes and need for care warrant INPATIENT evaluation and predict the patient’s care encounter to likely last beyond 2 midnights.     Jie Jimenez MD 10/03/17 1:30 PM         Electronically signed by Jie Jimenez MD at 10/3/2017  4:31 PM        Hospital Medications (active)       Dose Frequency Start End    barium sulfate (VARIBAR PUDDING) oral paste 20 mL 20 mL Once in Imaging 10/4/2017 10/4/2017    Sig - Route: Take 20 mL by mouth Once. - Oral    barium sulfate (VARIBAR THIN LIQUID) oral suspension 100 mL 100 mL Once in Imaging 10/4/2017 10/4/2017    Sig - Route: Take 100 mL by mouth Once. - Oral    carBAMazepine (TEGretol) tablet 200 mg 200 mg Every 12 Hours Scheduled 10/4/2017     Sig - Route: Take 1 tablet by mouth Every 12 (Twelve) Hours. - Oral    cefepime (MAXIPIME) 2 g/100 mL 0.9% NS (mbp) 2 g Every 12 Hours 10/4/2017     Sig - Route: Infuse 100 mL into a venous catheter Every 12 (Twelve) Hours. - Intravenous    dextrose (D50W) solution 25 g 25 g Every 15 Minutes PRN 10/3/2017     Sig - Route: Infuse 50 mL into a venous catheter Every 15 (Fifteen) Minutes As Needed for Low Blood Sugar (Blood Sugar Less Than 70, Patient Has IV Access - Unresponsive, NPO or Unable To Safely Swallow). - Intravenous    dextrose (GLUTOSE) oral gel 15 g 15 g Every 15 Minutes PRN 10/3/2017     Sig - Route: Take 15 g by mouth Every 15 (Fifteen) Minutes As Needed for Low Blood Sugar (Blood Sugar Less Than 70, Patient Alert, Is Not NPO & Can Safely Swallow). - Oral    enoxaparin (LOVENOX) syringe 40 mg 40 mg Daily 10/3/2017     Sig - Route: Inject 0.4 mL under the skin Daily. - Subcutaneous    glucagon  (GLUCAGEN) injection 1 mg 1 mg Every 15 Minutes PRN 10/3/2017     Sig - Route: Inject 1 mg under the skin Every 15 (Fifteen) Minutes As Needed (Blood Glucose Less Than 70 - Patient Without IV Access - Unresponsive, NPO or Unable To Safely Swallow). - Subcutaneous    hydrALAZINE (APRESOLINE) injection 10 mg 10 mg Every 6 Hours PRN 10/3/2017     Sig - Route: Infuse 0.5 mL into a venous catheter Every 6 (Six) Hours As Needed for High Blood Pressure. - Intravenous    influenza vac split quad (FLUZONE,FLUARIX,AFLURIA) injection 0.5 mL 0.5 mL Once 10/5/2017 10/5/2017    Sig - Route: Inject 0.5 mL into the shoulder, thigh, or buttocks 1 (One) Time. - Intramuscular    Cosign for Ordering: Accepted by Jie Jimenez MD on 10/4/2017  8:09 PM    insulin lispro (humaLOG) injection 0-7 Units 0-7 Units 4 Times Daily Before Meals & Nightly 10/3/2017     Sig - Route: Inject 0-7 Units under the skin 4 (Four) Times a Day Before Meals & at Bedtime. - Subcutaneous    Pharmacy Consult - Pharmacy to dose  Continuous PRN 10/5/2017     Sig - Route: Continuous As Needed for Consult. - Does not apply    sodium chloride 0.9 % flush 1-10 mL 1-10 mL As Needed 10/3/2017     Sig - Route: Infuse 1-10 mL into a venous catheter As Needed for Line Care. - Intravenous    Pharmacy Consult (Discontinued)  Continuous PRN 10/3/2017 10/5/2017    Sig - Route: Continuous As Needed for Consult. - Does not apply          Physician Progress Notes (last 24 hours) (Notes from 10/4/2017 12:18 PM through 10/5/2017 12:18 PM)     No notes of this type exist for this encounter.           Occupational Therapy Notes (most recent note)      Olivia Mcnamara OT at 10/5/2017 10:24 AM  Version 1 of 1         Acute Care - Occupational Therapy Initial Evaluation  [facility]     Patient Name: Sarah Beck  : 1935  MRN: [id number]  Today's Date: 10/5/2017  Onset of Illness/Injury or Date of Surgery Date: (P) 10/03/17  Date of Referral to OT: 10/04/17  Referring  Physician: (P) MD GRUPO    Admit Date: 10/3/2017       ICD-10-CM ICD-9-CM   1. Pharyngeal dysphagia R13.13 787.23   2. Impaired mobility and ADLs Z74.09 799.89   3. Impaired functional mobility, balance, gait, and endurance Z74.09 V49.89     Patient Active Problem List   Diagnosis   • Syncope with collapse   • LBBB (left bundle branch block)   • Seizure disorder   • RENATA treated with BiPAP   • Acute respiratory failure with hypoxia (POA)   • Essential hypertension   • Malignant neoplasm of left lung s/p radiation   • Bilateral pneumonia     Past Medical History:   Diagnosis Date   • Alzheimer disease    • Diabetes mellitus    • Gout    • Hypertension    • Lung cancer    • Seizure    • Sleep apnea     Bipap HS     Past Surgical History:   Procedure Laterality Date   • CATARACT EXTRACTION     • CERVICAL DISCECTOMY ANTERIOR     • HYSTERECTOMY     • JOINT REPLACEMENT      Bilateral Knees          OT ASSESSMENT FLOWSHEET (last 72 hours)      OT Evaluation       10/05/17 1000 10/05/17 0932 10/04/17 1621 10/04/17 1430 10/04/17 0900    Rehab Evaluation    Document Type (P)  evaluation   COMPLETED CHART REVIEW [id number]. CO-RX WITH O.T.   -CD evaluation  -AN  evaluation  -LS evaluation  -LS (r) AS (t) LS (c)    Subjective Information (P)  agree to therapy;no complaints  -CD agree to therapy;complains of;pain  -AN  no complaints;agree to therapy  -LS no complaints;agree to therapy  -LS (r) AS (t) LS (c)    Patient Effort, Rehab Treatment (P)  excellent  -CD good  -AN       Symptoms Noted During/After Treatment  fatigue;significant change in vital signs  -AN       Symptoms Noted Comment  see vitals, notified RN  -AN       General Information    Patient Profile Review (P)  yes  -CD yes  -AN       Onset of Illness/Injury or Date of Surgery Date (P)  10/03/17  -CD 10/03/17  -AN       Referring Physician (P)  MD GRUPO  -CD MD Grupo  -AN       General Observations (P)  PT SITTING UI UPON ARRIVAL WITH DTR AND  PRESENT.   -CD  Pt reclined in chair, daughter and spouse present. IV intace, O2NC  -AN       Pertinent History Of Current Problem (P)  PT TO OSH 9/26 AFTER CODING , WAS INTUBATED AND IN ICU. HAD HEART CATH AND CT OF CHECK CONCERNING FOR PNA. EKG REVEALED L BBB. PT WAS TF'D TO [facility] FOR POSSIBLE WIRELESS PACEMAKER PLACEMENT.   -CD Pt had cardiac arrest 9/26/17 at home, son performed CPR, EMS to OSH. Heart cath , ICU, intubated/extubated then sent to [facility] for higher level of care.  Pt with recent lung CA dx,. Pt also with decreased memory, activity and other episode of syncope this year.  -AN       Precautions/Limitations (P)  fall precautions;oxygen therapy device and L/min   RECENT CHEST COMPRESSIONS- SORE.   -CD fall precautions;cardiac precautions  -AN       Prior Level of Function (P)  independent:;all household mobility;community mobility;ADL's;dependent:;driving  -CD independent:;ADL's;mod assist:;home management;cooking;dependent:;driving  -AN       Equipment Currently Used at Home (P)  cane, straight;rollator;shower chair;grab bar;bipap/ cpap;raised toilet  -CD cane, quad;raised toilet;bath bench;bipap/ cpap;grab bar;rollator  -AN rollator;cane, straight;shower chair;grab bar;bipap/ cpap;raised toilet  -SG      Plans/Goals Discussed With (P)  patient and family;agreed upon  -CD patient and family;agreed upon  -AN       Risks Reviewed (P)  patient and family:;LOB;increased discomfort;change in vital signs  -CD patient and family:;LOB;dizziness;increased discomfort;change in vital signs  -AN       Benefits Reviewed (P)  patient and family:;improve function;increase independence;increase strength;increase balance;increase knowledge  -CD patient and family:;improve function;increase independence;increase strength;increase balance  -AN       Barriers to Rehab (P)  medically complex  -CD medically complex;previous functional deficit  -AN       Living Environment    Lives With (P)  spouse;child(froylan), adult  -CD spouse;child(froylan),  adult  -AN spouse;child(froylan), adult  -SG      Living Arrangements (P)  house  -CD house  -AN       Home Accessibility  tub/shower is not walk in  -AN       Transportation Available   car;family or friend will provide  -SG      Living Environment Comment (P)  PLANS TO GO TO SNF FOR REHAB.   -CD pt has TTB  -AN       Clinical Impression    Date of Referral to OT  10/04/17  -AN       OT Diagnosis  Decreased ADL I  -AN       Impairments Found (describe specific impairments)  aerobic capacity/endurance;gait, locomotion, and balance;motor function;muscle performance  -AN       Patient/Family Goals Statement  Agreeable to therapy; wants to go to SNF in [place] at discharge.  -AN       Criteria for Skilled Therapeutic Interventions Met  yes;treatment indicated  -AN       Rehab Potential  good, to achieve stated therapy goals  -AN       Therapy Frequency  daily  -AN       Anticipated Discharge Disposition  skilled nursing facility  -AN       Functional Level Prior    Ambulation   1-->assistive equipment  -SG      Transferring   1-->assistive equipment  -SG      Toileting   1-->assistive equipment  -SG      Bathing   1-->assistive equipment  -SG      Dressing   2-->assistive person  -SG      Eating   0-->independent  -SG      Communication   0-->understands/communicates without difficulty  -SG      Vital Signs    Pre Systolic BP Rehab (P)  --   VITALS PER O.T. NOTE.   -  -AN       Pre Treatment Diastolic BP  91  -AN       Post Systolic BP Rehab  131  -AN       Post Treatment Diastolic BP  55  -AN       Pretreatment Heart Rate (beats/min)  99  -AN       Posttreatment Heart Rate (beats/min)  108  -AN       Pre SpO2 (%)  95  -AN       O2 Delivery Pre Treatment  supplemental O2  -AN       Post SpO2 (%)  95  -AN       O2 Delivery Post Treatment  supplemental O2  -AN       Pain Assessment    Pain Assessment (P)  0-10  -CD 0-10  -AN  No/denies pain  -LS No/denies pain  -LS (r) AS (t) LS (c)    Pain Score (P)  4  -CD 4  -AN        Post Pain Score (P)  4  -CD 4  -AN       Pain Type (P)  Acute pain;Other (Comment)  -CD Acute pain;Other (Comment)  -AN       Pain Location (P)  Chest   PT HAD RECENT CHEST COMPRESSIONS DURING CODE PRIOR TO ADMIT.  -CD Chest  -AN       Pain Onset  Other (Comment)   since awakening from chest compressions  -AN       Pain Intervention(s) (P)  Repositioned  -CD Repositioned  -AN       Response to Interventions (P)  TOLERATED.   -CD tolerated  -AN       Vision Assessment/Intervention    Visual Impairment (P)  WFL with corrective lenses  -CD WFL with corrective lenses  -AN       Cognitive Assessment/Intervention    Current Cognitive/Communication Assessment (P)  impaired  -CD impaired  -AN       Orientation Status (P)  person;time   CUES FOR PLACE. KNEW HOSPITAL.   -CD oriented to;person;time;situation;place;required verbal cueing (specifiy in comments)   cues for place  -AN       Follows Commands/Answers Questions (P)  100% of the time;needs cueing;needs repetition;needs increased time;able to follow single-step instructions  -% of the time;needs cueing;needs increased time;needs repetition  -AN       Personal Safety (P)  mild impairment;decreased awareness, need for assist  -CD mild impairment;decreased awareness, need for assist  -AN       Personal Safety Interventions (P)  fall prevention program maintained  -CD fall prevention program maintained  -AN       ROM (Range of Motion)    General ROM (P)  no range of motion deficits identified   B LE'S   -CD upper extremity range of motion deficits identified  -AN       General ROM Detail  hx R shoulder injury 25% elevation; elbow , wrist, hand and L UE WFL  -AN       MMT (Manual Muscle Testing)    General MMT Assessment (P)  lower extremity strength deficits identified  -CD        General MMT Assessment Detail  R shoulder 2/5; remaining joints grossly 3+/5  -AN       Bed Mobility, Assessment/Treatment    Bed Mobility, Comment  pt up in chair  -AN       Transfer  Assessment/Treatment    Transfers, Sit-Stand Waller  contact guard assist;2 person assist required;verbal cues required  -AN       Transfers, Stand-Sit Waller  supervision required;contact guard assist  -AN       Transfers, Sit-Stand-Sit, Assist Device  rolling walker  -AN       Transfer, Impairments  strength decreased;impaired balance  -AN       Functional Mobility    Functional Mobility- Ind. Level  contact guard assist  -AN       Functional Mobility- Device  rolling walker  -AN       Functional Mobility- Comment  fatigued during walk, BP dropped following  -AN       Upper Body Bathing Assessment/Training    UB Bathing Assess/Train, Comment  simulated min  -AN       Lower Body Bathing Assessment/Training    LB Bathing Assess/Train, Comment  simulated mod  -AN       Upper Body Dressing Assessment/Training    UB Dressing Assess/Train, Clothing Type  donning:;hospital gown  -AN       UB Dressing Assess/Train, Position  sitting  -AN       UB Dressing Assess/Train, Waller  minimum assist (75% patient effort)  -AN       Motor Skills/Interventions    Additional Documentation  Balance Skills Training (Group);Fine Motor Coordination Training (Group);Gross Motor Coordination Training (Group)  -AN       Balance Skills Training    Sitting-Level of Assistance  Close supervision  -AN       Standing-Level of Assistance  Contact guard  -AN       Static Standing Balance Support  assistive device  -AN       Standing-Balance Activities  Weight Shift R-L  -AN       Gross Motor Coordination Training    Gross Motor Skill, Impairments Detail  WFL  -AN       Fine Motor Coordination Training    Detail (Fine Motor Coordination Training)  WFL  -AN       Positioning and Restraints    Pre-Treatment Position  sitting in chair/recliner  -AN       Post Treatment Position  chair  -AN       In Chair  reclined;call light within reach;encouraged to call for assist;exit alarm on;with family/caregiver  -AN         10/03/17 2000  10/03/17 1000             General Information    Equipment Currently Used at Home  walker, rolling  -DILSHAD       Living Environment    Lives With spouse;child(froylan), adult  -BM        Living Arrangements house  -BM        Home Accessibility no concerns  -BM        Stair Railings at Home none  -BM        Type of Financial/Environmental Concern none  -BM        Transportation Available family or friend will provide  -BM        Functional Level Prior    Ambulation  1-->assistive equipment  -DILSHAD       Transferring  1-->assistive equipment  -DILSHAD       Toileting  1-->assistive equipment  -DILSHAD       Bathing  1-->assistive equipment  -DILSHAD       Dressing  2-->assistive person  -DILSHAD       Eating  0-->independent  -DILSHAD       Communication  0-->understands/communicates without difficulty  -DILSHAD       Swallowing  0-->swallows foods/liquids without difficulty  -DILSHAD       Prior Functional Level Comment  r  -DILSHAD         User Key  (r) = Recorded By, (t) = Taken By, (c) = Cosigned By    Initials Name Effective Dates    CD Alisha Gomez, PT 06/19/15 -     GIOVANNY Mcnamara, OT 06/22/15 -     LS Usha Hernandez, MS [facility]-SLP 06/22/15 -     SG Lluvia Marte 05/02/16 -     DILSHAD Clemens, MADELEINE 06/16/16 -     BM Bárbara Sibley, RN 07/05/17 -     AS Laurel Mcgill, Speech Therapy Student 08/24/17 -            Occupational Therapy Education     Title: PT OT SLP Therapies (Active)     Topic: Occupational Therapy (Active)     Point: ADL training (Done)    Description: Instruct learner(s) on proper safety adaptation and remediation techniques during self care or transfers.   Instruct in proper use of assistive devices.    Learning Progress Summary    Learner Readiness Method Response Comment Documented by Status   Patient Acceptance CEASAR MILLER DU, NR Discussed deficits and contributions to decreased ADL's. Discussed POC and discharge planning. AN 10/05/17 1018 Done   Family Acceptance CEASAR MILLER DU, NR Discussed deficits and contributions to decreased ADL's.  Discussed POC and discharge planning. AN 10/05/17 1018 Done                      User Key     Initials Effective Dates Name Provider Type Discipline    AN 06/22/15 -  Olivia Mcnamara OT Occupational Therapist OT                  OT Recommendation and Plan  Anticipated Discharge Disposition: skilled nursing facility  Therapy Frequency: daily  Plan of Care Review  Plan Of Care Reviewed With: patient  Outcome Summary/Follow up Plan: Pt.  CGA with mobility and  transfers, but fatigues easily. Pt has complex PMH that will slow progress to PLOF with ADL's. Recommend SNF at discharge. Pt is agreeable, willing to participate in therapy.           OT Goals       10/05/17 1019          Transfer Training OT LTG    Transfer Training OT LTG, Date Established 10/05/17  -AN      Transfer Training OT LTG, Time to Achieve 1 wk  -AN      Transfer Training OT LTG, Activity Type all transfers  -AN      Transfer Training OT LTG, Boyd Level contact guard assist  -AN      Transfer Training OT LTG, Additional Goal with AD/DME as needed.  -AN      Range of Motion OT LTG    Range of Motion Goal OT LTG, Date Established 10/05/17  -AN      Range of Motion Goal OT LTG, Time to Achieve 1 wk  -AN      Range of Motion Goal OT LTG, Additional Goal Pt will participate in daily UE ex program to increase functional ROM, strength and endurance.  -AN      LB Dressing OT LTG    LB Dressing Goal OT LTG, Date Established 10/05/17  -AN      LB Dressing Goal OT LTG, Time to Achieve 1 wk  -AN      LB Dressing Goal OT LTG, Boyd Level minimum assist (75% patient effort)  -AN      Activity Tolerance OT LTG    Activity Tolerance Goal OT LTG, Date Established 10/05/17  -AN      Activity Tolerance Goal OT LTG, Time to Achieve 1 wk  -AN      Activity Tolerance Goal OT LTG, Activity Level 10 min activity  -AN      Activity Tolerance Goal OT LTG, Additional Goal with one rest break to increase functional endurance for ADLs.  -AN        User Key  (r) =  Recorded By, (t) = Taken By, (c) = Cosigned By    Initials Name Provider Type    GIOVANNY Mcnamara OT Occupational Therapist                Outcome Measures       10/05/17 0932          How much help from another is currently needed...    Putting on and taking off regular lower body clothing? 2  -AN      Bathing (including washing, rinsing, and drying) 2  -AN      Toileting (which includes using toilet bed pan or urinal) 3  -AN      Putting on and taking off regular upper body clothing 3  -AN      Taking care of personal grooming (such as brushing teeth) 3  -AN      Eating meals 4  -AN      Score 17  -AN      Functional Assessment    Outcome Measure Options AM-PAC 6 Clicks Daily Activity (OT)  -AN        User Key  (r) = Recorded By, (t) = Taken By, (c) = Cosigned By    Initials Name Provider Type    GIOVANNY Mcnamara OT Occupational Therapist          Time Calculation:   OT Start Time: 0932    Therapy Charges for Today     Code Description Service Date Service Provider Modifiers Qty    46291367939 HC OT EVAL LOW COMPLEXITY 4 10/5/2017 Olivia Mcnamara OT GO 1               Olivia Mcnamara OT  10/5/2017     Electronically signed by Olivia Mcnamara OT at 10/5/2017 10:24 AM           Speech Language Pathology Notes (all)      Usha Hernandez MS CCC-SLP at 10/3/2017  2:33 PM  Version 1 of 1            10/03/17 1432   SLP Deferred Reason   SLP Deferred Reason Patient unavailable for evaluation  (New orders received. Pt RJ for procedure. SLP to follow up 10/4.)        Electronically signed by Usha Hernandez MS CCC-SLP at 10/3/2017  2:33 PM      Usha G David, MS CCC-SLP at 10/4/2017  3:12 PM  Version 1 of 1         Problem: Patient Care Overview (Adult)  Goal: Plan of Care Review  Outcome: Ongoing (interventions implemented as appropriate)    10/04/17 1511   Coping/Psychosocial Response Interventions   Plan Of Care Reviewed With patient   Patient Care Overview   Progress progress toward functional goals as expected    Outcome Evaluation   Outcome Summary/Follow up Plan MBS complete. Mild pharyngeal dysphagia. Oral phase functional with adequate mastication and transit time. No pen/asp during this study even when pushed with thins, pudding, solid. Mild-mod pyriform and valleculae residue after the swallow w/ thins 2' decreased pharyngeal stripping and hyoid excursion. Pt with inconsistent spontaneous second swallow that cleared residue. SLP provided cues for second swallow x1. REC: regular diet and thin liquids, meds whole in thins or pureed. encourage a second swallow after sips of thins. dys tx.         Problem: Inpatient SLP  Goal: Dysphagia- Patient will safely consume diet as per recommendation with no signs/symptoms of aspiration  Outcome: Ongoing (interventions implemented as appropriate)    10/04/17 1511   Safely Consume Diet   Safely Consume Diet- SLP, Date Established 10/04/17   Safely Consume Diet- SLP, Time to Achieve by discharge   Safely Consume Diet- SLP, Date Goal Reviewed 10/04/17   Safely Consume Diet- SLP, Outcome goal ongoing              Electronically signed by Usha Hernandez MS CCC-SLP at 10/4/2017  3:12 PM      Usha Hernandez MS CCC-SLP at 10/4/2017  3:15 PM  Version 1 of 1         Acute Care - Speech Language Pathology   Swallow Initial Evaluation  [facility]   Modified Barium Swallow Study (MBS)       Patient Name: Sarah Beck  : 1935  MRN: [id number]  Today's Date: 10/4/2017               Admit Date: 10/3/2017    Visit Dx:     ICD-10-CM ICD-9-CM   1. Pharyngeal dysphagia R13.13 787.23     Patient Active Problem List   Diagnosis   • Syncope with collapse   • LBBB (left bundle branch block)   • Seizure disorder   • RENATA treated with BiPAP   • Acute respiratory failure with hypoxia (POA)   • Essential hypertension   • Malignant neoplasm of left lung s/p radiation   • Bilateral pneumonia     Past Medical History:   Diagnosis Date   • Alzheimer disease    • Diabetes mellitus    • Gout    •  Hypertension    • Lung cancer    • Seizure    • Sleep apnea     Bipap HS     Past Surgical History:   Procedure Laterality Date   • CATARACT EXTRACTION     • CERVICAL DISCECTOMY ANTERIOR     • HYSTERECTOMY     • JOINT REPLACEMENT      Bilateral Knees          SWALLOW EVALUATION (last 72 hours)      Swallow Evaluation       10/04/17 1430 10/04/17 0900             Rehab Evaluation    Document Type evaluation  -LS evaluation  -LS (r) AS (t) LS (c)       Subjective Information no complaints;agree to therapy  -LS no complaints;agree to therapy  -LS (r) AS (t) LS (c)       General Information    Patient Profile Review yes  -LS yes  -LS (r) AS (t) LS (c)       Onset of Illness/Injury 10/03/17  -LS 10/03/17  -LS (r) AS (t) LS (c)       Subjective Patient Observations alert and cooperative  -LS Alert and cooperative, though somewhat confused. Pt's family present in room.   -LS (r) AS (t) LS (c)       Pertinent History Of Current Problem   Admit for syncope, bilateral pneumonia, caridiac arrest (9/26). PMH: seizure disorder, acute respiratory failure, high blood pressure.   -LS Admit for syncope, bilateral pneumonia, caridiac arrest (9/26). PMH: seizure disorder, acute respiratory failure, high blood pressure.  -LS (r) AS (t) LS (c)       Current Diet Limitations NPO  -LS NPO  -LS (r) AS (t) LS (c)       Precautions/Limitations, Vision WFL with corrective lenses  -LS WFL;other (see comments)   for purposes of eval   -LS (r) AS (t) LS (c)       Precautions/Limitations, Hearing WFL;other (see comments)   for this eval  -LS WFL;other (see comments)   for purposes of eval   -LS (r) AS (t) LS (c)       Prior Level of Function- Communication functional in all spheres  -LS functional in all spheres;other (comment)   for purposes of eval   -LS (r) AS (t) LS (c)       Prior Level of Function- Swallowing no diet consistency restrictions  -LS no diet consistency restrictions  -LS (r) AS (t) LS (c)       Plans/Goals Discussed With  patient and family;agreed upon  -LS patient;family;agreed upon  -LS (r) AS (t) LS (c)       Barriers to Rehab none identified  -LS none identified  -LS (r) AS (t) LS (c)       Clinical Impression    Patient's Goals For Discharge return to PO diet  -LS return to PO diet  -LS (r) AS (t) LS (c)       Family Goals For Discharge patient able to eat/drink without coughing/choking  -LS patient able to return to PO diet  -LS (r) AS (t) LS (c)       SLP Swallowing Diagnosis mild dysphagia;pharyngeal dysfunction  -LS other (see comments)   r/o oropharyngeal dysphagia  -LS (r) AS (t) LS (c)       Rehab Potential/Prognosis, Swallowing good, to achieve stated therapy goals  -LS good, to achieve stated therapy goals  -LS (r) AS (t) LS (c)       Criteria for Skilled Therapeutic Interventions Met skilled criteria for dysphagia intervention met  -LS demonstrates skilled criteria for intervention;other (see comments)   SLP will f/u w/ MBSS  -LS (r) AS (t) LS (c)       SLP Diet Recommendation regular textures;thin liquids  -LS NPO: unsafe for food/liquid intake  -LS (r) AS (t) LS (c)       Recommended Diagnostics  reassess via VFSS (MBS)  -LS (r) AS (t) LS (c)       SLP Rec. for Method of Medication Administration meds whole with thin liquid;meds whole in pudding/applesauce  -LS meds crushed in pudding/applesauce  -LS (r) AS (t) LS (c)       Monitor For Signs Of Aspiration cough;throat clearing  -LS cough;throat clearing  -LS (r) AS (t) LS (c)       Pain Assessment    Pain Assessment No/denies pain  -LS No/denies pain  -LS (r) AS (t) LS (c)       Oral Motor Structure and Function    Oral Motor Anatomy and Physiology  patient demonstrates anatomy and physiology that is WNL  -LS (r) AS (t) LS (c)       Dentition Assessment  missing teeth;poor oral hygiene  -LS (r) AS (t) LS (c)       Secretion Management  WNL/WFL  -LS (r) AS (t) LS (c)       Mucosal Quality  dry  -LS (r) AS (t) LS (c)       Volitional Swallow  no difficulties initiating  "volitional swallow  -LS (r) AS (t) LS (c)       Volitional Cough  no difficulties initiating volitional cough  -LS (r) AS (t) LS (c)       Oral Musculature General Assessment  WFL (within functional limits)  -LS (r) AS (t) LS (c)       General Feeding/Swallowing Observations    Current Feeding Method  NPO  -LS (r) AS (t) LS (c)       Respiratory Support Currently in Use  nasal cannula in use  -LS (r) AS (t) LS (c)       Observations of Posture During Feeding  upright in bed  -LS (r) AS (t) LS (c)       Clinical Swallow Exam    Mode of Presentation  fed by clinician;cup;spoon;straw  -LS (r) AS (t) LS (c)       Oral Phase Results  intact oral phase without signs of dysfunction  -LS (r) AS (t) LS (c)       Pharyngeal Phase Results  cough  -LS (r) AS (t) LS (c)       Summary of Clinical Exam  Clinical dysphagia eval complete. Pt alert, cooperative, though confused. Pt's daughter reports a swallow evaluation was completed at [facility] recently. It was recommended the pt be on a regular diet and thin liquids; however, the pt's daughter continues to express concern. Pt and pt's daughter report foods and liquids recently becoming difficult to swallow and foods feeling \"stuck\" in throat. Trialed thins via spoon/cup/straw, nectar thick via cup/straw, puree, and solid. Immediate cough w/ thins. No overt clinical s/s aspiration w/ any other consistencies. REC: continue NPO. Meds crushed w/ applesauce. SLP will f/u w/ MBS to r/o pharyngeal dysphagia.   -LS (r) AS (t) LS (c)       Videofluoroscopic Swallowing Exam    Risks/Benefits Reviewed risks/benefits explained;agreed to eval;patient;family  -LS        Positioning Needs for Swallow Exam upright at 90 degrees  -LS        Motor Function During Phonation/Speech    Observation Anatomic Considerations no anatomic structural deviation via videofluroscopy  -LS        VFSS    Mode of Presentation thin:;pudding:;cohesive solid:  -LS        Pharyngeal Phase Physiologic " Impairment thin:  -LS        Post Swallow Residue thin:;residue present pyriform sinuses;residue present in valleculae  -LS        Rosenbek's PenAsp Scale thin:;pudding:;cohesive solid:;1-->Level 1  -LS        Pharyngeal Phase Results functional swallow  -LS        Summary of VFSS Mild pharyngeal dysphagia. Oral phase functional with adeqaute mastication and transit time. No pen/asp during this study even when pushed with thins, pudding, solid. Mild-mod pyriform and valleculea residue after the swallow w/ thins 2' decreased pharyngeal stripping and hyoid excursion. Pt with inconsistent spontaneous second swallow that cleared residue. SLP provided cues for second swallow x1. REC: regular diet and thin liqudis, meds whole in thins or pureed. encourage a second swallow after sips of thins. dys tx.  -LS        Swallow Recommendations    Oral Care  oral care with toothbrush and dentifrice BID and PRN  -LS (r) AS (t) LS (c)       Recommended Diet  NPO: unsafe for food/liquid intake  -LS (r) AS (t) LS (c)       Dysphagia Treatment Objectives and Progress    Dysphagia Treatment Objectives Improve hyolaryngeal excursion;Improve tongue base & pharyngeal wall squeeze  -LS        Improve hyolaryngeal excursion    To improve hyolaryngeal excursion, patient will: Complete head lift sustained (comment number of seconds);Complete head lift repetitive (comment number of lifts);Complete chin tuck against resistance (comment number of repetitions);80%;without cues  -LS        Status: Improve hyolaryngeal excursion New  -LS        Improve tongue base & pharyngeal wall squeeze    To improve tongue base & pharyngeal wall squeeze, patient will: Complete effortful swallow;Complete tongue hold swallow;80%;without cues  -LS        Status: Improve tongue base & pharyngeal wall squeeze New  -LS          User Key  (r) = Recorded By, (t) = Taken By, (c) = Cosigned By    Initials Name Effective Dates    NELLI Hernandez MS [facility]-SLP 06/22/15 -      AS Laurel Mcgill, Speech Therapy Student 08/24/17 -         EDUCATION  The patient has been educated in the following areas:   Dysphagia (Swallowing Impairment) Oral Care/Hydration.    SLP Recommendation and Plan  SLP Swallowing Diagnosis: mild dysphagia, pharyngeal dysfunction  SLP Diet Recommendation: regular textures, thin liquids     SLP Rec. for Method of Medication Administration: meds whole with thin liquid, meds whole in pudding/applesauce  Monitor For Signs Of Aspiration: cough, throat clearing     Criteria for Skilled Therapeutic Interventions Met: skilled criteria for dysphagia intervention met     Rehab Potential/Prognosis, Swallowing: good, to achieve stated therapy goals  Therapy Frequency: 5 times/wk             Plan of Care Review  Plan Of Care Reviewed With: patient  Progress: progress toward functional goals as expected  Outcome Summary/Follow up Plan: MBS complete. Mild pharyngeal dysphagia. Oral phase functional with adeqaute mastication and transit time. No pen/asp during this study even when pushed with thins, pudding, solid. Mild-mod pyriform and valleculea residue after the swallow w/ thins 2' decreased pharyngeal stripping and hyoid excursion. Pt with inconsistent spontaneous second swallow that cleared residue. SLP provided cues for second swallow x1. REC: regular diet and thin liqudis, meds whole in thins or pureed. encourage a second swallow after sips of thins. dys tx.          IP SLP Goals       10/04/17 1511          Safely Consume Diet    Safely Consume Diet- SLP, Date Established 10/04/17  -LS      Safely Consume Diet- SLP, Time to Achieve by discharge  -LS      Safely Consume Diet- SLP, Date Goal Reviewed 10/04/17  -LS      Safely Consume Diet- SLP, Outcome goal ongoing  -LS        User Key  (r) = Recorded By, (t) = Taken By, (c) = Cosigned By    Initials Name Provider Type    NELLI Hernandez MS CCC-SLP Speech and Language Pathologist               Time Calculation:          Time Calculation- SLP       10/04/17 1514 10/04/17 1202       Time Calculation- SLP    SLP Start Time 1430  -LS 0900  -LS (r) AS (t) LS (c)     SLP Received On 10/04/17  -LS 10/04/17  -LS (r) AS (t) LS (c)       User Key  (r) = Recorded By, (t) = Taken By, (c) = Cosigned By    Initials Name Provider Type     Usha Hernandez MS CCC-SLP Speech and Language Pathologist    AS Laurel Mcgill, Speech Therapy Student Speech Therapy Student          Therapy Charges for Today     Code Description Service Date Service Provider Modifiers Qty    93812207349 HC ST MOTION FLUORO EVAL SWALLOW 5 10/4/2017 Usha Hernandez MS CCC-SLP GN 1               Usha Hernandez MS CCC-SLP  10/4/2017     Electronically signed by Usha Hernandez MS CCC-SLP at 10/4/2017  3:16 PM

## 2017-10-05 NOTE — PROGRESS NOTES
"      HOSPITALIST DAILY PROGRESS NOTE    Chief Complaint: f/u for syncope    Subjective   SUBJECTIVE/OVERNIGHT EVENTS   No acute events overnight, patient states that she had a great night, slept well, she still endorses a cough and mild SOA    Review of Systems:  Gen-no fevers, no chills  CV-no chest pain, no palpitations  Resp-+cough, + dyspnea  GI-no N/V/D, no abd pain    Otherwise complete ROS is negative except as mentioned in the HPI.    Objective   OBJECTIVE   I have reviewed the vital signs.  /64 (BP Location: Right arm, Patient Position: Lying)  Pulse 81  Temp 98.8 °F (37.1 °C) (Oral)   Resp 18  Ht 66\" (167.6 cm)  Wt 166 lb 9.6 oz (75.6 kg)  SpO2 93%  BMI 26.89 kg/m2    Physical Exam:  Gen-no acute distress, seated in chair  CV-RRR, S1 S2 normal, no m/r/g  Resp-mod labored respirations, bibasilar rhonchi, no wheezes  Abd-soft, NT, ND, +BS  Ext-no edema  Neuro-A&Ox3, no focal deficits  Psych-appropriate mood and affect    Results:  I have reviewed the labs, radiology results, and diagnostic studies.      Results from last 7 days  Lab Units 10/03/17  1224   WBC 10*3/mm3 10.16   HEMOGLOBIN g/dL 11.1*   HEMATOCRIT % 33.9*   PLATELETS 10*3/mm3 234       Results from last 7 days  Lab Units 10/03/17  1224   SODIUM mmol/L 138   POTASSIUM mmol/L 4.3   CHLORIDE mmol/L 107   CO2 mmol/L 29.0   BUN mg/dL 13   CREATININE mg/dL 0.80   GLUCOSE mg/dL 102*   CALCIUM mg/dL 8.9     Radiology Results:  Imaging Results (last 24 hours)     Procedure Component Value Units Date/Time    FL Video Swallow With Speech [[id number]] Collected:  10/04/17 1543     Updated:  10/04/17 1646    Narrative:       EXAMINATION: FL VIDEO SWALLOW W SPEECH-     INDICATION: dysphagia         TECHNIQUE: 1 minute of fluoroscopic time was used for this exam. 1  associated image was saved. The patient was evaluated in the seated  lateral position while taking a variety of consistencies of barium by  mouth under the direction of speech " pathology.     COMPARISON: NONE     FINDINGS: There was no penetration and no aspiration with any of the  media ingested.          Impression:       Fluoroscopy provided for a modified barium swallow. Please  see speech therapy report for full details and recommendations.         This report was finalized on 10/4/2017 4:44 PM by Dr. Chucho Peña.       MRI Brain Without Contrast [[id number]] Collected:  10/05/17 0821     Updated:  10/05/17 0831    Narrative:       EXAMINATION: MRI BRAIN WO CONTRAST-     INDICATION: Syncope; R13.13-Dysphagia, pharyngeal phase.      TECHNIQUE: Multiplanar MRI of the brain without intravenous contrast  administration.     COMPARISON: None.     FINDINGS: No restriction on diffusion-weighted sequences. Midline  structures are symmetric without evidence of mass, mass effect or  midline shift. Ventricles and sulci are mildly prominent in particular  within the anterior and inferior temporal regions and towards the vertex  consistent with atrophy. Moderate T2/FLAIR hyperintense signal within  the periventricular and deep white matter consistent with chronic small  vessel ischemic disease. Pituitary and sella within normal limits.  Cervicomedullary junction widely patent.  Globes and orbits retain  normal signal characteristics. Visualized paranasal sinuses and mastoid  air cells demonstrate small right mastoid effusion otherwise grossly  clear and well pneumatized. Normal signal flow voids within the distal  internal carotid and basilar arteries.       Impression:       1. No acute intracranial abnormality.  2. Moderate chronic small vessel ischemic changes and volume loss from  atrophy most prominent in the mesiotemporal region.  3. Nonspecific small right mastoid effusion.      D:  10/05/2017  E:  10/05/2017             I have reviewed the medications.    Assessment/Plan   ASSESSMENT/PLAN    Principal Problem:    Syncope with collapse  Active Problems:    LBBB (left bundle branch block)     Seizure disorder    RENATA treated with BiPAP    Acute respiratory failure with hypoxia (POA)    Essential hypertension    Malignant neoplasm of left lung s/p radiation    Bilateral pneumonia    82 yof with a hx of CVA with residual generalized weakness, well controlled diabetes A1C 5.8%, RENATA on bipap, hx pf lung CA s/p radiation followed at , seizure disorder and two years of syncopal episodes, she was transferred here for possible leadless pacemaker placement.      Plan:  - Hx of syncope with collapse, multiple episodes in the past, etiology unknown, extensive w/u (data deficient) unrevealing, suspected PE CTA negative, episodes preceded by seizure like activity, neurology consulted, their impression is this was likely cardiac.Getting MRI head  - Patient with LBBB, s/p VT/Vfib (data deficient), s/p LHC with clean coronaries and normal EF, cardiology following, from additional hx it is likley that patient has some autonomic dysfunction  As he BP has been labile per family and is mostly low during her syncopal episodes.  - Bilateral LL consolidation and opacifications, concerns for aspiration pna, will start cefepime, SLP has evaluated, likely with mild dysphagia, rec reg texture/thin liquids  - Hx of lung CA s/p radiation x5, no chemo followed at , with   - Hx of sz disorder, continue home rx, neuro checking carbamazepine leve  - Hx of well controlled DM A1C 5.8 continue SSI  - Hx of RENATA continue home bipap  - DVT prophylaxis: Lovenox     Dispo: anticipate d/c to rehab, CM following    Jie Jimenez MD  10/05/17  10:03 AM

## 2017-10-05 NOTE — THERAPY TREATMENT NOTE
Acute Care - Speech Language Pathology   Swallow Treatment Note [facility]     Patient Name: Sarah Beck  : 1935  MRN: [id number]  Today's Date: 10/5/2017  Onset of Illness/Injury or Date of Surgery Date: 10/03/17            Admit Date: 10/3/2017    Visit Dx:      ICD-10-CM ICD-9-CM   1. Pharyngeal dysphagia R13.13 787.23   2. Impaired mobility and ADLs Z74.09 799.89   3. Impaired functional mobility, balance, gait, and endurance Z74.09 V49.89     Patient Active Problem List   Diagnosis   • Syncope with collapse   • LBBB (left bundle branch block)   • Seizure disorder   • RENATA treated with BiPAP   • Acute respiratory failure with hypoxia (POA)   • Essential hypertension   • Malignant neoplasm of left lung s/p radiation   • Bilateral pneumonia             Adult Rehabilitation Note       10/05/17 1300          Rehab Assessment/Intervention    Discipline speech language pathologist  -HG      Document Type therapy note (daily note)  -HG      Subjective Information agree to therapy  -HG      Patient Effort, Rehab Treatment excellent  -HG      Recorded by [HG] Yanet Ruano MS CCC-SLP      Pain Assessment    Pain Assessment 0-10  -HG      Pain Score 10  -HG      Pain Type Acute pain  -HG      Pain Location Chest   chest compressions from code on prior admit  -HG      Recorded by [HG] Yanet Ruano MS CCC-SLP      Dysphagia Treatment Objectives and Progress    Dysphagia Treatment Objectives Improve hyolaryngeal excursion;Improve tongue base & pharyngeal wall squeeze  -HG      Recorded by [HG] Yanet Ruano MS CCC-SLP      Improve hyolaryngeal excursion    To improve hyolaryngeal excursion, patient will: Complete head lift sustained (comment number of seconds);Complete head lift repetitive (comment number of lifts);Complete chin tuck against resistance (comment number of repetitions);80%;without cues  -HG      Status: Improve hyolaryngeal excursion Progressing as expected  -HG      Hyolaryngeal  Excursion Progress 50%;with consistent cues;following model;continue to adress  -HG      Comments: Improve hyolaryngeal excursion Neck pain prior to exercises.   -HG      Recorded by [HG] Yanet Ruano MS CCC-SLP      Improve tongue base & pharyngeal wall squeeze    To improve tongue base & pharyngeal wall squeeze, patient will: Complete effortful swallow;Complete tongue hold swallow;80%;without cues  -HG      Status: Improve tongue base & pharyngeal wall squeeze Progressing as expected  -HG      Tongue Base/Pharyngeal Wall Squeeze Progress 70%;with consistent cues;following model;continue to adress  -HG      Recorded by [HG] Yanet Ruano MS CCC-SLP        User Key  (r) = Recorded By, (t) = Taken By, (c) = Cosigned By    Initials Name Effective Dates    HG Yanet Ruano MS CCC-SLP 06/22/15 -                   IP SLP Goals       10/05/17 1313 10/04/17 1511       Safely Consume Diet    Safely Consume Diet- SLP, Date Established 10/05/17  -HG 10/04/17  -LS     Safely Consume Diet- SLP, Time to Achieve  by discharge  -LS     Safely Consume Diet- SLP, Date Goal Reviewed  10/04/17  -LS     Safely Consume Diet- SLP, Outcome goal ongoing  -HG goal ongoing  -LS       User Key  (r) = Recorded By, (t) = Taken By, (c) = Cosigned By    Initials Name Provider Type    NELLI Hernandez MS CCC-SLP Speech and Language Pathologist    BRENDA Ruano MS CCC-SLP Speech and Language Pathologist          EDUCATION  The patient has been educated in the following areas:   Dysphagia (Swallowing Impairment).    SLP Recommendation and Plan       Plan of Care Review  Plan Of Care Reviewed With: patient, family  Outcome Summary/Follow up Plan: Pt seen for dysphagia tx this date with family present. Pt tolerating current diet and liquids but feeling weak this date and pain in chest from chest compressions given from previous code prior to this admit. Pt completed swallowing exercises x 2 each with 50% accuracy. Exercises left  for pt- cont with dysphagia tx x 1 and then D/C from skilled services.            Time Calculation:         Time Calculation- SLP       10/05/17 1316          Time Calculation- SLP    SLP Start Time 1300  -HG      SLP Received On 10/05/17  -HG        User Key  (r) = Recorded By, (t) = Taken By, (c) = Cosigned By    Initials Name Provider Type     Yanet Ruano MS CCC-SLP Speech and Language Pathologist          Therapy Charges for Today     Code Description Service Date Service Provider Modifiers Qty    75961918967 HC ST TREATMENT SWALLOW 1 10/5/2017 Yanet Ruano MS CCC-SLP GN 1                 Yanet Ruano MS CCC-SLP  10/5/2017

## 2017-10-05 NOTE — THERAPY EVALUATION
Acute Care - Occupational Therapy Initial Evaluation  [facility]     Patient Name: Sarah Beck  : 1935  MRN: [id number]  Today's Date: 10/5/2017  Onset of Illness/Injury or Date of Surgery Date: (P) 10/03/17  Date of Referral to OT: 10/04/17  Referring Physician: MD LOMBARDO (P)    Admit Date: 10/3/2017       ICD-10-CM ICD-9-CM   1. Pharyngeal dysphagia R13.13 787.23   2. Impaired mobility and ADLs Z74.09 799.89   3. Impaired functional mobility, balance, gait, and endurance Z74.09 V49.89     Patient Active Problem List   Diagnosis   • Syncope with collapse   • LBBB (left bundle branch block)   • Seizure disorder   • RENATA treated with BiPAP   • Acute respiratory failure with hypoxia (POA)   • Essential hypertension   • Malignant neoplasm of left lung s/p radiation   • Bilateral pneumonia     Past Medical History:   Diagnosis Date   • Alzheimer disease    • Diabetes mellitus    • Gout    • Hypertension    • Lung cancer    • Seizure    • Sleep apnea     Bipap HS     Past Surgical History:   Procedure Laterality Date   • CATARACT EXTRACTION     • CERVICAL DISCECTOMY ANTERIOR     • HYSTERECTOMY     • JOINT REPLACEMENT      Bilateral Knees          OT ASSESSMENT FLOWSHEET (last 72 hours)      OT Evaluation       10/05/17 1000 10/05/17 0932 10/04/17 1621 10/04/17 1430 10/04/17 0900    Rehab Evaluation    Document Type (P)  evaluation   COMPLETED CHART REVIEW [id number]. CO-RX WITH O.T.   -CD evaluation  -AN  evaluation  -LS evaluation  -LS (r) AS (t) LS (c)    Subjective Information (P)  agree to therapy;no complaints  -CD agree to therapy;complains of;pain  -AN  no complaints;agree to therapy  -LS no complaints;agree to therapy  -LS (r) AS (t) LS (c)    Patient Effort, Rehab Treatment (P)  excellent  -CD good  -AN       Symptoms Noted During/After Treatment  fatigue;significant change in vital signs  -AN       Symptoms Noted Comment  see vitals, notified RN  -AN       General Information    Patient Profile  Review (P)  yes  -CD yes  -AN       Onset of Illness/Injury or Date of Surgery Date (P)  10/03/17  -CD 10/03/17  -AN       Referring Physician (P)  MD GRUPO  -CD MD Grupo  -AN       General Observations (P)  PT SITTING UIC UPON ARRIVAL WITH DTR AND  PRESENT.   -CD Pt reclined in chair, daughter and spouse present. IV intace, O2NC  -AN       Pertinent History Of Current Problem (P)  PT TO OSH 9/26 AFTER CODING , WAS INTUBATED AND IN ICU. HAD HEART CATH AND CT OF CHECK CONCERNING FOR PNA. EKG REVEALED L BBB. PT WAS TF'D TO [facility] FOR POSSIBLE WIRELESS PACEMAKER PLACEMENT.   -CD Pt had cardiac arrest 9/26/17 at home, son performed CPR, EMS to OSH. Heart cath , ICU, intubated/extubated then sent to [facility] for higher level of care.  Pt with recent lung CA dx,. Pt also with decreased memory, activity and other episode of syncope this year.  -AN       Precautions/Limitations (P)  fall precautions;oxygen therapy device and L/min   RECENT CHEST COMPRESSIONS- SORE.   -CD fall precautions;cardiac precautions  -AN       Prior Level of Function (P)  independent:;all household mobility;community mobility;ADL's;dependent:;driving  -CD independent:;ADL's;mod assist:;home management;cooking;dependent:;driving  -AN       Equipment Currently Used at Home (P)  cane, straight;rollator;shower chair;grab bar;bipap/ cpap;raised toilet  -CD cane, quad;raised toilet;bath bench;bipap/ cpap;grab bar;rollator  -AN rollator;cane, straight;shower chair;grab bar;bipap/ cpap;raised toilet  -SG      Plans/Goals Discussed With (P)  patient and family;agreed upon  -CD patient and family;agreed upon  -AN       Risks Reviewed (P)  patient and family:;LOB;increased discomfort;change in vital signs  -CD patient and family:;LOB;dizziness;increased discomfort;change in vital signs  -AN       Benefits Reviewed (P)  patient and family:;improve function;increase independence;increase strength;increase balance;increase knowledge  -CD patient and  family:;improve function;increase independence;increase strength;increase balance  -AN       Barriers to Rehab (P)  medically complex  -CD medically complex;previous functional deficit  -AN       Living Environment    Lives With (P)  spouse;child(froylan), adult  -CD spouse;child(froylan), adult  -AN spouse;child(froylan), adult  -SG      Living Arrangements (P)  house  -CD house  -AN       Home Accessibility  tub/shower is not walk in  -AN       Transportation Available   car;family or friend will provide  -SG      Living Environment Comment (P)  PLANS TO GO TO SNF FOR REHAB.   -CD pt has TTB  -AN       Clinical Impression    Date of Referral to OT  10/04/17  -AN       OT Diagnosis  Decreased ADL I  -AN       Impairments Found (describe specific impairments)  aerobic capacity/endurance;gait, locomotion, and balance;motor function;muscle performance  -AN       Patient/Family Goals Statement  Agreeable to therapy; wants to go to SNF in [place] at discharge.  -AN       Criteria for Skilled Therapeutic Interventions Met  yes;treatment indicated  -AN       Rehab Potential  good, to achieve stated therapy goals  -AN       Therapy Frequency  daily  -AN       Anticipated Discharge Disposition  skilled nursing facility  -AN       Functional Level Prior    Ambulation   1-->assistive equipment  -SG      Transferring   1-->assistive equipment  -SG      Toileting   1-->assistive equipment  -SG      Bathing   1-->assistive equipment  -SG      Dressing   2-->assistive person  -SG      Eating   0-->independent  -SG      Communication   0-->understands/communicates without difficulty  -SG      Vital Signs    Pre Systolic BP Rehab (P)  --   VITALS PER O.T. NOTE.   -  -AN       Pre Treatment Diastolic BP  91  -AN       Post Systolic BP Rehab  131  -AN       Post Treatment Diastolic BP  55  -AN       Pretreatment Heart Rate (beats/min)  99  -AN       Posttreatment Heart Rate (beats/min)  108  -AN       Pre SpO2 (%)  95  -AN       O2  Delivery Pre Treatment  supplemental O2  -AN       Post SpO2 (%)  95  -AN       O2 Delivery Post Treatment  supplemental O2  -AN       Pain Assessment    Pain Assessment (P)  0-10  -CD 0-10  -AN  No/denies pain  -LS No/denies pain  -LS (r) AS (t) LS (c)    Pain Score (P)  4  -CD 4  -AN       Post Pain Score (P)  4  -CD 4  -AN       Pain Type (P)  Acute pain;Other (Comment)  -CD Acute pain;Other (Comment)  -AN       Pain Location (P)  Chest   PT HAD RECENT CHEST COMPRESSIONS DURING CODE PRIOR TO ADMIT.  -CD Chest  -AN       Pain Onset  Other (Comment)   since awakening from chest compressions  -AN       Pain Intervention(s) (P)  Repositioned  -CD Repositioned  -AN       Response to Interventions (P)  TOLERATED.   -CD tolerated  -AN       Vision Assessment/Intervention    Visual Impairment (P)  WFL with corrective lenses  -CD WFL with corrective lenses  -AN       Cognitive Assessment/Intervention    Current Cognitive/Communication Assessment (P)  impaired  -CD impaired  -AN       Orientation Status (P)  person;time   CUES FOR PLACE. [facility]NEW HOSPITAL.   -CD oriented to;person;time;situation;place;required verbal cueing (specifiy in comments)   cues for place  -AN       Follows Commands/Answers Questions (P)  100% of the time;needs cueing;needs repetition;needs increased time;able to follow single-step instructions  -% of the time;needs cueing;needs increased time;needs repetition  -AN       Personal Safety (P)  mild impairment;decreased awareness, need for assist  -CD mild impairment;decreased awareness, need for assist  -AN       Personal Safety Interventions (P)  fall prevention program maintained  -CD fall prevention program maintained  -AN       ROM (Range of Motion)    General ROM (P)  no range of motion deficits identified   B LE'S   -CD upper extremity range of motion deficits identified  -AN       General ROM Detail  hx R shoulder injury 25% elevation; elbow , wrist, hand and L UE WFL  -AN       MMT (Manual  Muscle Testing)    General MMT Assessment (P)  lower extremity strength deficits identified  -CD        General MMT Assessment Detail  R shoulder 2/5; remaining joints grossly 3+/5  -AN       Bed Mobility, Assessment/Treatment    Bed Mobility, Comment  pt up in chair  -AN       Transfer Assessment/Treatment    Transfers, Sit-Stand [place]  contact guard assist;2 person assist required;verbal cues required  -AN       Transfers, Stand-Sit [place]  supervision required;contact guard assist  -AN       Transfers, Sit-Stand-Sit, Assist Device  rolling walker  -AN       Transfer, Impairments  strength decreased;impaired balance  -AN       Functional Mobility    Functional Mobility- Ind. Level  contact guard assist  -AN       Functional Mobility- Device  rolling walker  -AN       Functional Mobility- Comment  fatigued during walk, BP dropped following  -AN       Upper Body Bathing Assessment/Training    UB Bathing Assess/Train, Comment  simulated min  -AN       Lower Body Bathing Assessment/Training    LB Bathing Assess/Train, Comment  simulated mod  -AN       Upper Body Dressing Assessment/Training    UB Dressing Assess/Train, Clothing Type  donning:;hospital gown  -AN       UB Dressing Assess/Train, Position  sitting  -AN       UB Dressing Assess/Train, [place]  minimum assist (75% patient effort)  -AN       Motor Skills/Interventions    Additional Documentation  Balance Skills Training (Group);Fine Motor Coordination Training (Group);Gross Motor Coordination Training (Group)  -AN       Balance Skills Training    Sitting-Level of Assistance  Close supervision  -AN       Standing-Level of Assistance  Contact guard  -AN       Static Standing Balance Support  assistive device  -AN       Standing-Balance Activities  Weight Shift R-L  -AN       Gross Motor Coordination Training    Gross Motor Skill, Impairments Detail  WFL  -AN       Fine Motor Coordination Training    Detail (Fine Motor Coordination Training)   WFL  -AN       Positioning and Restraints    Pre-Treatment Position  sitting in chair/recliner  -AN       Post Treatment Position  chair  -AN       In Chair  reclined;call light within reach;encouraged to call for assist;exit alarm on;with family/caregiver  -AN         10/03/17 2000 10/03/17 1000             General Information    Equipment Currently Used at Home  walker, rolling  -DILSHAD       Living Environment    Lives With spouse;child(froylan), adult  -BM        Living Arrangements house  -BM        Home Accessibility no concerns  -BM        Stair Railings at Home none  -BM        Type of Financial/Environmental Concern none  -BM        Transportation Available family or friend will provide  -BM        Functional Level Prior    Ambulation  1-->assistive equipment  -DILSHAD       Transferring  1-->assistive equipment  -DILSHAD       Toileting  1-->assistive equipment  -DILSHAD       Bathing  1-->assistive equipment  -DILSHAD       Dressing  2-->assistive person  -DILSHAD       Eating  0-->independent  -DILSHAD       Communication  0-->understands/communicates without difficulty  -DILSHAD       Swallowing  0-->swallows foods/liquids without difficulty  -DILSHAD       Prior Functional Level Comment  r  -DILSHAD         User Key  (r) = Recorded By, (t) = Taken By, (c) = Cosigned By    Initials Name Effective Dates    CD Alisha Gomez, PT 06/19/15 -     GIOVANNY Mcnamara, OT 06/22/15 -     LS Usha Hernandez, MS CCC-SLP 06/22/15 -     SG Lluvia Marte 05/02/16 -     DILSHAD Clemens, MADELEINE 06/16/16 -     BM Bárbara Sibley, RN 07/05/17 -     BERNICE Mcgill, Speech Therapy Student 08/24/17 -            Occupational Therapy Education     Title: PT OT SLP Therapies (Active)     Topic: Occupational Therapy (Active)     Point: ADL training (Done)    Description: Instruct learner(s) on proper safety adaptation and remediation techniques during self care or transfers.   Instruct in proper use of assistive devices.    Learning Progress Summary    Learner Readiness Method  Response Comment Documented by Status   Patient Acceptance CEASAR MILLER DU, NR Discussed deficits and contributions to decreased ADL's. Discussed POC and discharge planning. AN 10/05/17 1018 Done   Family Acceptance CEASAR MILLER DU, NR Discussed deficits and contributions to decreased ADL's. Discussed POC and discharge planning. AN 10/05/17 1018 Done                      User Key     Initials Effective Dates Name Provider Type Discipline    AN 06/22/15 -  Olivia Mcnamara, OT Occupational Therapist OT                  OT Recommendation and Plan  Anticipated Discharge Disposition: skilled nursing facility  Therapy Frequency: daily  Plan of Care Review  Plan Of Care Reviewed With: patient  Outcome Summary/Follow up Plan: Pt.  CGA with mobility and  transfers, but fatigues easily. Pt has complex PMH that will slow progress to PLOF with ADL's. Recommend SNF at discharge. Pt is agreeable, willing to participate in therapy.           OT Goals       10/05/17 1019          Transfer Training OT LTG    Transfer Training OT LTG, Date Established 10/05/17  -AN      Transfer Training OT LTG, Time to Achieve 1 wk  -AN      Transfer Training OT LTG, Activity Type all transfers  -AN      Transfer Training OT LTG, [place] Level contact guard assist  -AN      Transfer Training OT LTG, Additional Goal with AD/DME as needed.  -AN      Range of Motion OT LTG    Range of Motion Goal OT LTG, Date Established 10/05/17  -AN      Range of Motion Goal OT LTG, Time to Achieve 1 wk  -AN      Range of Motion Goal OT LTG, Additional Goal Pt will participate in daily UE ex program to increase functional ROM, strength and endurance.  -AN      LB Dressing OT LTG    LB Dressing Goal OT LTG, Date Established 10/05/17  -AN      LB Dressing Goal OT LTG, Time to Achieve 1 wk  -AN      LB Dressing Goal OT LTG, [place] Level minimum assist (75% patient effort)  -AN      Activity Tolerance OT LTG    Activity Tolerance Goal OT LTG, Date Established 10/05/17  -AN       Activity Tolerance Goal OT LTG, Time to Achieve 1 wk  -AN      Activity Tolerance Goal OT LTG, Activity Level 10 min activity  -AN      Activity Tolerance Goal OT LTG, Additional Goal with one rest break to increase functional endurance for ADLs.  -AN        User Key  (r) = Recorded By, (t) = Taken By, (c) = Cosigned By    Initials Name Provider Type    GIOVANNY Mcnamara OT Occupational Therapist                Outcome Measures       10/05/17 0932          How much help from another is currently needed...    Putting on and taking off regular lower body clothing? 2  -AN      Bathing (including washing, rinsing, and drying) 2  -AN      Toileting (which includes using toilet bed pan or urinal) 3  -AN      Putting on and taking off regular upper body clothing 3  -AN      Taking care of personal grooming (such as brushing teeth) 3  -AN      Eating meals 4  -AN      Score 17  -AN      Functional Assessment    Outcome Measure Options AM-PAC 6 Clicks Daily Activity (OT)  -AN        User Key  (r) = Recorded By, (t) = Taken By, (c) = Cosigned By    Initials Name Provider Type    GIOVANNY Mcnamara OT Occupational Therapist          Time Calculation:   OT Start Time: 0932    Therapy Charges for Today     Code Description Service Date Service Provider Modifiers Qty    [id number]  OT EVAL LOW COMPLEXITY 4 10/5/2017 Olivia Mcnamara OT GO 1               Olivia Mcnamara OT  10/5/2017

## 2017-10-05 NOTE — PROGRESS NOTES
Continued Stay Note  [facility]     Patient Name: Sarah Beck  MRN: [id number]  Today's Date: 10/5/2017    Admit Date: 10/3/2017          Discharge Plan       10/05/17 1220    Case Management/Social Work Plan    Plan snf    Patient/Family In Agreement With Plan yes    Additional Comments Faxed referral to [facility].  Will cont to follow.              Discharge Codes     None        Expected Discharge Date and Time     Expected Discharge Date Expected Discharge Time    Oct 6, 2017             Lluvia Marte

## 2017-10-05 NOTE — PROGRESS NOTES
Daily Progress Note  Neurology     LOS: 2 days     Subjective     Chief Complaint: Syncope, V. fib arrest    Interval History:  No acute events overnight.    ROS: Negative for fever    Objective     Vital signs in last 24 hours:  Temp:  [98.3 °F (36.8 °C)-98.8 °F (37.1 °C)] 98.3 °F (36.8 °C)  Heart Rate:  [81-94] 94  Resp:  [18-20] 20  BP: (140-153)/(64-71) 153/71      Physical Exam:   General: Lying in bed with eyes open. In NAD.     Respiratory: Respirations unlabored   CV: RRR       Neurologic Exam:   Mental status: Awake, alert, Follows commands. Speech fluent   CN: II-XII intact                     Results from last 7 days  Lab Units 10/03/17  1224   WBC 10*3/mm3 10.16   HEMOGLOBIN g/dL 11.1*   HEMATOCRIT % 33.9*   PLATELETS 10*3/mm3 234           Results Review:    Labs reviewed  MRI brain personally reviewed.  No acute process seen.  Agree with the report    Assessment/Plan     Principal Problem:    Syncope with collapse  Active Problems:    LBBB (left bundle branch block)    Seizure disorder    RENATA treated with BiPAP    Acute respiratory failure with hypoxia (POA)    Essential hypertension    Malignant neoplasm of left lung s/p radiation    Bilateral pneumonia        1.  Syncope = likely of cardiac etiology given her a witnessed cardiac arrest.  The associated convulsions were likely manifestation of syncopal myoclonus versus less likely provoked seizure. Continue medical/cardiac workup and supportive care.     2.  Cardiac arrest = s/p resuscitation. cardiology following     3.  Seizure disorder = on carbamazepine 200 mg twice a day.  CBZ level therapeutic     4.  Alzheimer's disease = continue memantine 5 mg twice a day.  Recommend outpatient neuro follow-up (family prefers to see Dr. Yenni Aguilera in [place]).     5.  Dysphagia = SLP following.  MRI brain unremarkable      No further recommendations at this time.  Neuro will follow as needed    Maria Elena Jung MD  10/05/17  4:30 PM

## 2017-10-05 NOTE — PLAN OF CARE
Problem: Patient Care Overview (Adult)  Goal: Plan of Care Review  Outcome: Ongoing (interventions implemented as appropriate)    Problem: Fall Risk (Adult)  Goal: Absence of Falls  Outcome: Ongoing (interventions implemented as appropriate)    Problem: Older Inpatient, Acutely Ill (Adult)  Goal: Signs and Symptoms of Listed Potential Problems Will be Absent or Manageable (Older Inpatient, Acutely Ill)  Outcome: Ongoing (interventions implemented as appropriate)

## 2017-10-05 NOTE — PROGRESS NOTES
"[facility]  Progress Note       LOS: 2 days   Patient Care Team:  Damian Miller MD as PCP - General (General Practice)    Chief Complaint:  Follow up syncope    Subjective           Review of Systems:   Pertinent positives in HPI, all others reviewed and negative.      Objective       Current Facility-Administered Medications:   •  carBAMazepine (TEGretol) tablet 200 mg, 200 mg, Oral, Q12H, Maria Elena Jung MD, 200 mg at 10/04/17 2050  •  cefepime (MAXIPIME) 2 g/100 mL 0.9% NS (mbp), 2 g, Intravenous, Q12H, WILDER Brady Boils, RPH, 2 g at 10/05/17 0510  •  dextrose (D50W) solution 25 g, 25 g, Intravenous, Q15 Min PRN, Jie Jimenez MD  •  dextrose (GLUTOSE) oral gel 15 g, 15 g, Oral, Q15 Min PRN, Jie Jimenez MD  •  enoxaparin (LOVENOX) syringe 40 mg, 40 mg, Subcutaneous, Daily, Jie Jimenez MD, 40 mg at 10/04/17 0853  •  glucagon (GLUCAGEN) injection 1 mg, 1 mg, Subcutaneous, Q15 Min PRN, Jie Jimenez MD  •  hydrALAZINE (APRESOLINE) injection 10 mg, 10 mg, Intravenous, Q6H PRN, REN Wilkins  •  influenza vac split quad (FLUZONE,FLUARIX,AFLURIA) injection 0.5 mL, 0.5 mL, Intramuscular, Once, Jie Jimenez MD  •  insulin lispro (humaLOG) injection 0-7 Units, 0-7 Units, Subcutaneous, 4x Daily AC & at Bedtime, Jie Jimenez MD, Stopped at 10/04/17 0852  •  Pharmacy Consult - Pharmacy to dose, , Does not apply, Continuous PRN, Jie Jimenez MD  •  sodium chloride 0.9 % flush 1-10 mL, 1-10 mL, Intravenous, PRN, Jie Jimenez MD    Vital Sign Min/Max for last 24 hours  Temp  Min: 98.8 °F (37.1 °C)  Max: 98.8 °F (37.1 °C)   BP  Min: 140/64  Max: 144/64   Pulse  Min: 81  Max: 92   Resp  Min: 18  Max: 18   No Data Recorded   Flow (L/min)  Min: 3  Max: 3   Weight  Min: 158 lb (71.7 kg)  Max: 166 lb 9.6 oz (75.6 kg)     Flowsheet Rows         First Filed Value    Admission Height  66\" (167.6 cm) Documented at 10/03/2017 0915    Admission Weight  166 lb (75.3 kg) " Documented at 10/03/2017 0915            Intake/Output Summary (Last 24 hours) at 10/05/17 0927  Last data filed at 10/04/17 1744   Gross per 24 hour   Intake              220 ml   Output                0 ml   Net              220 ml       Physical Exam:     General Appearance:    Alert, cooperative, in no acute distress   Lungs:     CTA    Heart:    RRR NL S1 S2 S4   Chest Wall:    No abnormalities observed, tender to palpation.    Abdomen:     Normal bowel sounds, no masses, no organomegaly, soft        non-tender, non-distended, no guarding, no rebound                tenderness   Extremities:   Moves all extremities well, no edema, no cyanosis, no             redness   Pulses:   Pulses palpable and equal bilaterally   Skin:   No bleeding, bruising or rash        Results Review:     Results from last 7 days  Lab Units 10/03/17  1224   WBC 10*3/mm3 10.16   HEMOGLOBIN g/dL 11.1*   HEMATOCRIT % 33.9*   PLATELETS 10*3/mm3 234       Results from last 7 days  Lab Units 10/03/17  1224   SODIUM mmol/L 138   POTASSIUM mmol/L 4.3   CHLORIDE mmol/L 107   CO2 mmol/L 29.0   BUN mg/dL 13   CREATININE mg/dL 0.80   GLUCOSE mg/dL 102*                                Intake/Output Summary (Last 24 hours) at 10/05/17 0927  Last data filed at 10/04/17 1744   Gross per 24 hour   Intake              220 ml   Output                0 ml   Net              220 ml       I personally viewed and interpreted the patient's EKG/Telemetry data    EKG: 10/3/17: NSR 83 bpm, LBBB    Telemetry: NSR    Ejection Fraction  No results found for: EF    Echo EF Estimated  No results found for: ECHOEFEST      Present on Admission:  • Syncope with collapse  • LBBB (left bundle branch block)  • Seizure disorder  • RENATA treated with BiPAP  • Acute respiratory failure with hypoxia (POA)  • Essential hypertension  • Malignant neoplasm of left lung s/p radiation    Assessment/Plan   1.  Recurrent Syncope, without injury:                        -  To date, no  documented arrhythmias or pauses.  Patient wore an event monitor several months ago - we'll try to obtain results. ? Device in the future ie Pacemaker depending on information we can obtain.                         -  Rule out sinus arrest.                        - Neuro to see if syncope due to sz disorder                            2.   Ventricular fibrillation                        -  No records available at time of transfer.  Try to obtain any telemetry strips of arrhythmia.                        -  Patient did receive CPR and shock ×1.                        -  Essentially normal coronary arteries on [facility] 9/30/17, normal EF. Echocardiogram 9/27/17 EF 65%     3.  Left bundle branch block     4.  Pneumonia                        -  Progressive cough over the last 6 months, worrisome for aspiration.                        -  CT chest at outside facility shows bilateral lobe pneumonia.  Treated with IV Merrem, IV Zyvox.     5.  Seizure disorder/syncope                        -  Diagnosed 20 years ago.  On chronic Tegretol. Will consult Neuro today.      6.  Essential hypertension                        -  Poorly controlled.  Adjust current medications.                        -  Monitor renal function.     7.  RENATA                        -  Continue with BiPAP     8.  Left lower lobe lung carcinoma                        -  Status post radiation ×5, March 2017.  First CT s/p radiation showed improvement.  Next CT scheduled for October 24.                        -  Followed by Dr. Duffy, [facility].          MAXINE Escobedo  10/05/17  9:27 AM    I, Narendra Aguilar, have reviewed the note in full and agree with all aspects of the above including physical exam, assessment, labs and plan with changes made accordingly. Face to Face Time was spent with the patient.    Narendra Aguilar DO  10/05/17  3:32 PM

## 2017-10-05 NOTE — PLAN OF CARE
Problem: Patient Care Overview (Adult)  Goal: Plan of Care Review  Outcome: Ongoing (interventions implemented as appropriate)    10/05/17 9922   Coping/Psychosocial Response Interventions   Plan Of Care Reviewed With patient   Patient Care Overview   Progress no change   Outcome Evaluation   Outcome Summary/Follow up Plan pt continues with generalzed weakness.          Problem: Older Inpatient, Acutely Ill (Adult)  Goal: Signs and Symptoms of Listed Potential Problems Will be Absent or Manageable (Older Inpatient, Acutely Ill)  Outcome: Ongoing (interventions implemented as appropriate)

## 2017-10-05 NOTE — PROGRESS NOTES
Continued Stay Note  [facility]     Patient Name: Sarah Beck  MRN: [id number]  Today's Date: 10/5/2017    Admit Date: 10/3/2017          Discharge Plan       10/05/17 1359    Case Management/Social Work Plan    Additional Comments Spoke with Carmen at [facility] in [place].  They have offered a STR bed anytime pt is medically ready.  They can accept pt on Saturdays, but must have the DC summary and Med Rec by 1230.  The do not accept admissions on Sundays.  CM will cont  to follow for DC readiness.       10/05/17 1220    Case Management/Social Work Plan    Plan snf    Patient/Family In Agreement With Plan yes    Additional Comments Faxed referral to [facility].  Will cont to follow.              Discharge Codes     None        Expected Discharge Date and Time     Expected Discharge Date Expected Discharge Time    Oct 6, 2017             Lluvia Marte

## 2017-10-06 LAB
GLUCOSE BLDC GLUCOMTR-MCNC: 104 MG/DL (ref 70–130)
GLUCOSE BLDC GLUCOMTR-MCNC: 111 MG/DL (ref 70–130)
GLUCOSE BLDC GLUCOMTR-MCNC: 119 MG/DL (ref 70–130)
GLUCOSE BLDC GLUCOMTR-MCNC: 132 MG/DL (ref 70–130)

## 2017-10-06 PROCEDURE — 99231 SBSQ HOSP IP/OBS SF/LOW 25: CPT | Performed by: PSYCHIATRY & NEUROLOGY

## 2017-10-06 PROCEDURE — 97116 GAIT TRAINING THERAPY: CPT

## 2017-10-06 PROCEDURE — 82962 GLUCOSE BLOOD TEST: CPT

## 2017-10-06 PROCEDURE — 99232 SBSQ HOSP IP/OBS MODERATE 35: CPT | Performed by: INTERNAL MEDICINE

## 2017-10-06 PROCEDURE — 92526 ORAL FUNCTION THERAPY: CPT

## 2017-10-06 PROCEDURE — 25010000002 CEFEPIME

## 2017-10-06 PROCEDURE — 25010000002 ENOXAPARIN PER 10 MG: Performed by: INTERNAL MEDICINE

## 2017-10-06 RX ORDER — LEVETIRACETAM 250 MG/1
250 TABLET ORAL EVERY 12 HOURS SCHEDULED
Status: DISCONTINUED | OUTPATIENT
Start: 2017-10-06 | End: 2017-10-09 | Stop reason: HOSPADM

## 2017-10-06 RX ADMIN — CARBAMAZEPINE 200 MG: 200 TABLET ORAL at 09:33

## 2017-10-06 RX ADMIN — ENOXAPARIN SODIUM 40 MG: 40 INJECTION SUBCUTANEOUS at 09:33

## 2017-10-06 RX ADMIN — CEFEPIME 2 G: 2 INJECTION, POWDER, FOR SOLUTION INTRAVENOUS at 18:04

## 2017-10-06 RX ADMIN — LEVETIRACETAM 250 MG: 250 TABLET, FILM COATED ORAL at 20:28

## 2017-10-06 RX ADMIN — CEFEPIME 2 G: 2 INJECTION, POWDER, FOR SOLUTION INTRAVENOUS at 05:25

## 2017-10-06 RX ADMIN — CARBAMAZEPINE 200 MG: 200 TABLET ORAL at 20:28

## 2017-10-06 RX ADMIN — LEVETIRACETAM 250 MG: 250 TABLET, FILM COATED ORAL at 13:36

## 2017-10-06 NOTE — PROGRESS NOTES
Daily Progress Note  Neurology     LOS: 3 days     Subjective     Chief Complaint: Syncope    Interval History:  No acute events overnight.    ROS: Negative for fever    Objective     Vital signs in last 24 hours:  Temp:  [98.5 °F (36.9 °C)-98.6 °F (37 °C)] 98.5 °F (36.9 °C)  Heart Rate:  [81-94] 94  Resp:  [18] 18  BP: (137-161)/(59-70) 161/70      Physical Exam:   General: Lying in bed with eyes closed. In NAD.     Respiratory: Respirations unlabored   CV: RRR       Neurologic Exam:   Mental status: Sleeping but arousable.  Follows commands   CN: II-XII intact                     Results from last 7 days  Lab Units 10/03/17  1224   WBC 10*3/mm3 10.16   HEMOGLOBIN g/dL 11.1*   HEMATOCRIT % 33.9*   PLATELETS 10*3/mm3 234           Results Review:    Labs reviewed    Assessment/Plan     Principal Problem:    Syncope with collapse  Active Problems:    LBBB (left bundle branch block)    Seizure disorder    RENATA treated with BiPAP    Acute respiratory failure with hypoxia (POA)    Essential hypertension    Malignant neoplasm of left lung s/p radiation    Bilateral pneumonia        1.  Syncope = unclear etiology. Initially thought due to possible cardiac arrest, but after discussing with Dr. Jesus, he feels that it is unlikely the patient had true Vfib or Vtach arrest. Seizure is also within the differential     2.  Seizure disorder = on carbamazepine 200 mg twice a day.  CBZ level therapeutic. Will add Keppra 250mg BID     3.  Alzheimer's disease = continue memantine 5 mg twice a day.  Recommend outpatient neuro follow-up (family prefers to see Dr. Yenni Aguilera in [place]).       No further recommendations at this time.  Neuro will follow as needed    Maria Elena Jung MD  10/06/17  12:12 PM

## 2017-10-06 NOTE — PROGRESS NOTES
"      HOSPITALIST DAILY PROGRESS NOTE    Chief Complaint: f/u for syncope    Subjective   SUBJECTIVE/OVERNIGHT EVENTS   Patient states that she did not feel good last evening, she did not get enough sleep. This morning she is awake and alert.denies any SOA, no fevers    Review of Systems:  Gen-no fevers, no chills  CV-no chest pain, no palpitations  Resp-no cough, no dyspnea  GI-no N/V/D, no abd pain    Otherwise complete ROS is negative except as mentioned in the HPI.    Objective   OBJECTIVE   I have reviewed the vital signs.  /70 (BP Location: Right arm, Patient Position: Lying)  Pulse 94  Temp 98.5 °F (36.9 °C) (Oral)   Resp 18  Ht 66\" (167.6 cm)  Wt 166 lb 9.6 oz (75.6 kg)  SpO2 99%  BMI 26.89 kg/m2    Physical Exam:  Gen-no acute distress, laying in bed comfortablely  CV-RRR, S1 S2 normal, no m/r/g  Resp-CTAB, no wheezes  Abd-soft, NT, ND, +BS  Ext-no edema  Neuro-A&Ox3, no focal deficits  Psych-appropriate mood and affect    Results:  I have reviewed the labs, radiology results, and diagnostic studies.      Results from last 7 days  Lab Units 10/03/17  1224   WBC 10*3/mm3 10.16   HEMOGLOBIN g/dL 11.1*   HEMATOCRIT % 33.9*   PLATELETS 10*3/mm3 234       Results from last 7 days  Lab Units 10/03/17  1224   SODIUM mmol/L 138   POTASSIUM mmol/L 4.3   CHLORIDE mmol/L 107   CO2 mmol/L 29.0   BUN mg/dL 13   CREATININE mg/dL 0.80   GLUCOSE mg/dL 102*   CALCIUM mg/dL 8.9     Radiology Results:  Imaging Results (last 24 hours)     Procedure Component Value Units Date/Time    MRI Brain Without Contrast [[id number]] Collected:  10/05/17 0821     Updated:  10/05/17 1752    Narrative:       EXAMINATION: MRI BRAIN WO CONTRAST-     INDICATION: Syncope; R13.13-Dysphagia, pharyngeal phase.      TECHNIQUE: Multiplanar MRI of the brain without intravenous contrast  administration.     COMPARISON: None.     FINDINGS: No restriction on diffusion-weighted sequences. Midline  structures are symmetric without evidence of " mass, mass effect or  midline shift. Ventricles and sulci are mildly prominent in particular  within the anterior and inferior temporal regions and towards the vertex  consistent with atrophy. Moderate T2/FLAIR hyperintense signal within  the periventricular and deep white matter consistent with chronic small  vessel ischemic disease. Pituitary and sella within normal limits.  Cervicomedullary junction widely patent.  Globes and orbits retain  normal signal characteristics. Visualized paranasal sinuses and mastoid  air cells demonstrate small right mastoid effusion otherwise grossly  clear and well pneumatized. Normal signal flow voids within the distal  internal carotid and basilar arteries.       Impression:       1. No acute intracranial abnormality.  2. Moderate chronic small vessel ischemic changes and volume loss from  atrophy most prominent in the mesiotemporal region.  3. Nonspecific small right mastoid effusion.      D:  10/05/2017  E:  10/05/2017     This report was finalized on 10/5/2017 5:50 PM by Dr. Chucho Peña.           I have reviewed the medications.    Assessment/Plan   ASSESSMENT/PLAN    Principal Problem:    Syncope with collapse  Active Problems:    LBBB (left bundle branch block)    Seizure disorder    RENATA treated with BiPAP    Acute respiratory failure with hypoxia (POA)    Essential hypertension    Malignant neoplasm of left lung s/p radiation    Bilateral pneumonia    82 yof with a hx of CVA with residual generalized weakness, well controlled diabetes A1C 5.8%, RENATA on bipap, hx pf lung CA s/p radiation followed at , seizure disorder and two years of syncopal episodes, she was transferred here for possible leadless pacemaker placement.      Plan:  - Hx of syncope with collapse, multiple episodes in the past, etiology unknown, extensive w/u (data deficient) unrevealing, suspected PE though CTA negative, episodes preceded by seizure like activity, neurology consulted, their impression is  this was likely cardiac. MRI head wnl  - Patient with LBBB, s/p VT/Vfib (data deficient), s/p LHC with clean coronaries and normal EF, cardiology following, from additional hx it is likley that patient has some autonomic dysfunction  as her BP has been labile per family and is mostly low during her syncopal episodes. Her BP rx also need reviewing  - Bilateral LL consolidation and opacifications, concerns for aspiration pna, continue cefepime, she has been on abx ~11 days, SLP has evaluated, likely with mild dysphagia, rec reg texture/thin liquids  - Hx of lung CA s/p radiation x5, no chemo followed at , with   - Hx of sz disorder, continue home rx, carbamazepine level appropriate  - Hx of well controlled DM A1C 5.8 continue SSI  - Hx of RENATA continue home bipap  - DVT prophylaxis: Lovenox      Dispo: anticipate d/c to rehab, CM following.    Jie Jimenez MD  10/06/17  9:13 AM

## 2017-10-06 NOTE — PLAN OF CARE
Problem: Patient Care Overview (Adult)  Goal: Plan of Care Review  Outcome: Ongoing (interventions implemented as appropriate)    10/06/17 1619   Coping/Psychosocial Response Interventions   Plan Of Care Reviewed With patient;daughter   Patient Care Overview   Progress improving   Outcome Evaluation   Outcome Summary/Follow up Plan Dysphagia tx complete. Pt completed effortful swallow, chin tuck against resistance, and Kathleen exercises with good effort. Pt achieved all goals and is tolerating regular and thins. Dtr reports no difficulty w /meals. No futher dys tx needs at this time. Family interested in pursuing cog-comm intervention though acknowledge that pt is quite resistant, often combative, to discussion related to cognitive status. Will likely benefit from OP neuro f/u first to determine if would benefit from s/l tx services. If pursued, cog-comm would be better addressed as OP/HH. For now, SLP will f/u for education and discuss appropriate POC.          Problem: Inpatient SLP  Goal: Dysphagia- Patient will safely consume diet as per recommendation with no signs/symptoms of aspiration  Outcome: Outcome(s) achieved Date Met:  10/06/17    10/05/17 1313 10/06/17 1619   Safely Consume Diet   Safely Consume Diet- SLP, Date Established 10/05/17 --    Safely Consume Diet- SLP, Time to Achieve --  by discharge   Safely Consume Diet- SLP, Date Goal Reviewed --  10/06/17   Safely Consume Diet- SLP, Outcome --  goal met

## 2017-10-06 NOTE — PLAN OF CARE
Problem: Patient Care Overview (Adult)  Goal: Plan of Care Review  Outcome: Ongoing (interventions implemented as appropriate)    10/06/17 1450   Coping/Psychosocial Response Interventions   Plan Of Care Reviewed With patient;family   Patient Care Overview   Progress improving   Outcome Evaluation   Outcome Summary/Follow up Plan pt gets some sob.needs cues for walker.ambulat 250 ft.         Problem: Inpatient Physical Therapy  Goal: Bed Mobility Goal LTG- PT  Outcome: Outcome(s) achieved Date Met:  10/06/17    10/05/17 1430 10/06/17 1450   Bed Mobility PT LTG   Bed Mobility PT LTG, Time to Achieve 2 wks --    Bed Mobility PT LTG, Activity Type all bed mobility --    Bed Mobility PT LTG, Rogers Level contact guard assist --    Bed Mobility PT LTG, Outcome --  goal met       Goal: Transfer Training Goal 1 LTG- PT  Outcome: Ongoing (interventions implemented as appropriate)    10/05/17 1430 10/06/17 1450   Transfer Training PT LTG   Transfer Training PT LTG, Activity Type all transfers --    Transfer Training PT LTG, Rogers Level supervision required --    Transfer Training PT LTG, Assist Device walker, rolling --    Transfer Training PT LTG, Outcome --  goal ongoing       Goal: Gait Training Goal LTG- PT  Outcome: Ongoing (interventions implemented as appropriate)    10/05/17 1430 10/06/17 1450   Gait Training PT LTG   Gait Training Goal PT LTG, Time to Achieve 2 wks --    Gait Training Goal PT LTG, Rogers Level supervision required --    Gait Training Goal PT LTG, Assist Device walker, rolling --    Gait Training Goal PT LTG, Distance to Achieve 350 --    Gait Training Goal PT LTG, Outcome --  goal ongoing

## 2017-10-06 NOTE — PLAN OF CARE
Problem: Patient Care Overview (Adult)  Goal: Plan of Care Review  Outcome: Ongoing (interventions implemented as appropriate)    10/06/17 1374   Coping/Psychosocial Response Interventions   Plan Of Care Reviewed With patient;daughter   Patient Care Overview   Progress improving   Outcome Evaluation   Outcome Summary/Follow up Plan keppra added as new seizure medication, multiple allergies, tolerated keppra.          Problem: Older Inpatient, Acutely Ill (Adult)  Goal: Signs and Symptoms of Listed Potential Problems Will be Absent or Manageable (Older Inpatient, Acutely Ill)  Outcome: Ongoing (interventions implemented as appropriate)

## 2017-10-06 NOTE — THERAPY TREATMENT NOTE
Acute Care - Speech Language Pathology   Swallow Initial Evaluation Psychiatric      Patient Name: Sarah Beck  : 1935  MRN: [id number]  Today's Date: 10/6/2017  Onset of Illness/Injury or Date of Surgery Date: 10/03/17            Admit Date: 10/3/2017    Visit Dx:     ICD-10-CM ICD-9-CM   1. Pharyngeal dysphagia R13.13 787.23   2. Impaired mobility and ADLs Z74.09 799.89   3. Impaired functional mobility, balance, gait, and endurance Z74.09 V49.89     Patient Active Problem List   Diagnosis   • Syncope with collapse   • LBBB (left bundle branch block)   • Seizure disorder   • RENATA treated with BiPAP   • Acute respiratory failure with hypoxia (POA)   • Essential hypertension   • Malignant neoplasm of left lung s/p radiation   • Bilateral pneumonia     Past Medical History:   Diagnosis Date   • Alzheimer disease    • Diabetes mellitus    • Gout    • Hypertension    • Lung cancer    • Seizure    • Sleep apnea     Bipap HS     Past Surgical History:   Procedure Laterality Date   • CATARACT EXTRACTION     • CERVICAL DISCECTOMY ANTERIOR     • HYSTERECTOMY     • JOINT REPLACEMENT      Bilateral Knees          SWALLOW EVALUATION (last 72 hours)      Swallow Evaluation       10/04/17 1430 10/04/17 0900             Rehab Evaluation    Document Type evaluation  -LS evaluation  -LS (r) AS (t) LS (c)       Subjective Information no complaints;agree to therapy  -LS no complaints;agree to therapy  -LS (r) AS (t) LS (c)       General Information    Patient Profile Review yes  -LS yes  -LS (r) AS (t) LS (c)       Onset of Illness/Injury 10/03/17  -LS 10/03/17  -LS (r) AS (t) LS (c)       Subjective Patient Observations alert and cooperative  -LS Alert and cooperative, though somewhat confused. Pt's family present in room.   -LS (r) AS (t) LS (c)       Pertinent History Of Current Problem   Admit for syncope, bilateral pneumonia, caridiac arrest (). PMH: seizure disorder, acute respiratory failure, high blood  pressure.   -LS Admit for syncope, bilateral pneumonia, caridiac arrest (9/26). PMH: seizure disorder, acute respiratory failure, high blood pressure.  -LS (r) AS (t) LS (c)       Current Diet Limitations NPO  -LS NPO  -LS (r) AS (t) LS (c)       Precautions/Limitations, Vision WFL with corrective lenses  -LS WFL;other (see comments)   for purposes of eval   -LS (r) AS (t) LS (c)       Precautions/Limitations, Hearing WFL;other (see comments)   for this eval  -LS WFL;other (see comments)   for purposes of eval   -LS (r) AS (t) LS (c)       Prior Level of Function- Communication functional in all spheres  -LS functional in all spheres;other (comment)   for purposes of eval   -LS (r) AS (t) LS (c)       Prior Level of Function- Swallowing no diet consistency restrictions  -LS no diet consistency restrictions  -LS (r) AS (t) LS (c)       Plans/Goals Discussed With patient and family;agreed upon  -LS patient;family;agreed upon  -LS (r) AS (t) LS (c)       Barriers to Rehab none identified  -LS none identified  -LS (r) AS (t) LS (c)       Clinical Impression    Patient's Goals For Discharge return to PO diet  -LS return to PO diet  -LS (r) AS (t) LS (c)       Family Goals For Discharge patient able to eat/drink without coughing/choking  -LS patient able to return to PO diet  -LS (r) AS (t) LS (c)       SLP Swallowing Diagnosis mild dysphagia;pharyngeal dysfunction  -LS other (see comments)   r/o oropharyngeal dysphagia  -LS (r) AS (t) LS (c)       Rehab Potential/Prognosis, Swallowing good, to achieve stated therapy goals  -LS good, to achieve stated therapy goals  -LS (r) AS (t) LS (c)       Criteria for Skilled Therapeutic Interventions Met skilled criteria for dysphagia intervention met  -LS demonstrates skilled criteria for intervention;other (see comments)   SLP will f/u w/ MBSS  -LS (r) AS (t) LS (c)       Therapy Frequency 5 times/wk  -LS        SLP Diet Recommendation regular textures;thin liquids  -LS NPO:  unsafe for food/liquid intake  -LS (r) AS (t) LS (c)       Recommended Diagnostics  reassess via VFSS (MBS)  -LS (r) AS (t) LS (c)       SLP Rec. for Method of Medication Administration meds whole with thin liquid;meds whole in pudding/applesauce  -LS meds crushed in pudding/applesauce  -LS (r) AS (t) LS (c)       Monitor For Signs Of Aspiration cough;throat clearing  -LS cough;throat clearing  -LS (r) AS (t) LS (c)       Pain Assessment    Pain Assessment No/denies pain  -LS No/denies pain  -LS (r) AS (t) LS (c)       Oral Motor Structure and Function    Oral Motor Anatomy and Physiology  patient demonstrates anatomy and physiology that is WNL  -LS (r) AS (t) LS (c)       Dentition Assessment  missing teeth;poor oral hygiene  -LS (r) AS (t) LS (c)       Secretion Management  WNL/WFL  -LS (r) AS (t) LS (c)       Mucosal Quality  dry  -LS (r) AS (t) LS (c)       Volitional Swallow  no difficulties initiating volitional swallow  -LS (r) AS (t) LS (c)       Volitional Cough  no difficulties initiating volitional cough  -LS (r) AS (t) LS (c)       Oral Musculature General Assessment  WFL (within functional limits)  -LS (r) AS (t) LS (c)       General Feeding/Swallowing Observations    Current Feeding Method  NPO  -LS (r) AS (t) LS (c)       Respiratory Support Currently in Use  nasal cannula in use  -LS (r) AS (t) LS (c)       Observations of Posture During Feeding  upright in bed  -LS (r) AS (t) LS (c)       Clinical Swallow Exam    Mode of Presentation  fed by clinician;cup;spoon;straw  -LS (r) AS (t) LS (c)       Oral Phase Results  intact oral phase without signs of dysfunction  -LS (r) AS (t) LS (c)       Pharyngeal Phase Results  cough  -LS (r) AS (t) LS (c)       Summary of Clinical Exam  Clinical dysphagia eval complete. Pt alert, cooperative, though confused. Pt's daughter reports a swallow evaluation was completed at [facility] recently. It was recommended the pt be on a regular diet and thin liquids;  "however, the pt's daughter continues to express concern. Pt and pt's daughter report foods and liquids recently becoming difficult to swallow and foods feeling \"stuck\" in throat. Trialed thins via spoon/cup/straw, nectar thick via cup/straw, puree, and solid. Immediate cough w/ thins. No overt clinical s/s aspiration w/ any other consistencies. REC: continue NPO. Meds crushed w/ applesauce. SLP will f/u w/ MBS to r/o pharyngeal dysphagia.   -LS (r) AS (t) LS (c)       Videofluoroscopic Swallowing Exam    Risks/Benefits Reviewed risks/benefits explained;agreed to eval;patient;family  -LS        Positioning Needs for Swallow Exam upright at 90 degrees  -LS        Motor Function During Phonation/Speech    Observation Anatomic Considerations no anatomic structural deviation via videofluroscopy  -LS        VFSS    Mode of Presentation thin:;pudding:;cohesive solid:  -LS        Pharyngeal Phase Physiologic Impairment thin:  -LS        Post Swallow Residue thin:;residue present pyriform sinuses;residue present in valleculae  -LS        Rosenbek's PenAsp Scale thin:;pudding:;cohesive solid:;1-->Level 1  -LS        Pharyngeal Phase Results functional swallow  -        Summary of VFSS Mild pharyngeal dysphagia. Oral phase functional with adeqaute mastication and transit time. No pen/asp during this study even when pushed with thins, pudding, solid. Mild-mod pyriform and valleculea residue after the swallow w/ thins 2' decreased pharyngeal stripping and hyoid excursion. Pt with inconsistent spontaneous second swallow that cleared residue. SLP provided cues for second swallow x1. REC: regular diet and thin liqudis, meds whole in thins or pureed. encourage a second swallow after sips of thins. dys tx.  -LS        Swallow Recommendations    Oral Care  oral care with toothbrush and dentifrice BID and PRN  -LS (r) AS (t) LS (c)       Recommended Diet  NPO: unsafe for food/liquid intake  -LS (r) AS (t) LS (c)       Dysphagia " Treatment Objectives and Progress    Dysphagia Treatment Objectives Improve hyolaryngeal excursion;Improve tongue base & pharyngeal wall squeeze  -LS        Improve hyolaryngeal excursion    To improve hyolaryngeal excursion, patient will: Complete head lift sustained (comment number of seconds);Complete head lift repetitive (comment number of lifts);Complete chin tuck against resistance (comment number of repetitions);80%;without cues  -LS        Status: Improve hyolaryngeal excursion New  -LS        Improve tongue base & pharyngeal wall squeeze    To improve tongue base & pharyngeal wall squeeze, patient will: Complete effortful swallow;Complete tongue hold swallow;80%;without cues  -LS        Status: Improve tongue base & pharyngeal wall squeeze New  -LS          User Key  (r) = Recorded By, (t) = Taken By, (c) = Cosigned By    Initials Name Effective Dates    LS Usha Hernandez MS [facility]-SLP 06/22/15 -     AS Laurel Mcgill, Speech Therapy Student 08/24/17 -         EDUCATION  The patient has been educated in the following areas:   Dysphagia (Swallowing Impairment).    SLP Recommendation and Plan                                           Plan of Care Review  Plan Of Care Reviewed With: (P) patient, daughter  Progress: (P) improving  Outcome Summary/Follow up Plan: (P) Dysphagia tx complete. Pt completed effortful swallow, chin tuck against resistance, and Kathleen exercises with good effort. Pt achieved all goals and is tolerating regular and thins. Dtr reports no difficulty w /meals. No futher dys tx needs at this time. Family interested in pursuing cog-comm intervention though acknowledge that pt is quite resistant, often combative, to discussion related to cognitive status.  Will likely benefit from OP neuro f/u first to determine if would benefit from s/l tx services.  If pursued, cog-comm would be better addressed as OP/HH.  For now, SLP will f/u for education and discuss appropriate POC.           IP SLP Goals        10/06/17 1619 10/05/17 1313 10/04/17 1511    Safely Consume Diet    Safely Consume Diet- SLP, Date Established  10/05/17  -HG 10/04/17  -LS    Safely Consume Diet- SLP, Time to Achieve (P)  by discharge  -RK  by discharge  -LS    Safely Consume Diet- SLP, Date Goal Reviewed (P)  10/06/17  -RK  10/04/17  -LS    Safely Consume Diet- SLP, Outcome (P)  goal met  -RK goal ongoing  -HG goal ongoing  -LS      User Key  (r) = Recorded By, (t) = Taken By, (c) = Cosigned By    Initials Name Provider Type    LS Usha Hernandez, MS CCC-SLP Speech and Language Pathologist    HG Yanet Ruano, MS [facility]-SLP Speech and Language Pathologist    MARTINA Elliott, Speech Therapy Student Speech Therapy Student               Time Calculation:         Time Calculation- SLP       10/06/17 1632          Time Calculation- SLP    SLP Start Time (P)  1600  -RK      SLP Received On (P)  10/06/17  -RK        User Key  (r) = Recorded By, (t) = Taken By, (c) = Cosigned By    Initials Name Provider Type    MARTINA Elliott Speech Therapy Student Speech Therapy Student          Therapy Charges for Today     Code Description Service Date Service Provider Modifiers Qty    [id number] HC ST TREATMENT SWALLOW 2 10/6/2017 Derek Elliott Speech Therapy Student GN 1               Derek Elliott Speech Therapy Student  10/6/2017

## 2017-10-06 NOTE — PROGRESS NOTES
"[facility]  Progress Note       LOS: 3 days   Patient Care Team:  Damian Miller MD as PCP - General (General Practice)    Chief Complaint:  Follow up syncope    Subjective     No complaints of CP or SOB. BP has been labile 130s-160s. No syncope. Felt weak yesterday. Has been up with PT and worked with speech as well. Family plans for patient to go to rehab upon discharge probably early next week.       Review of Systems:   Pertinent positives in HPI, all others reviewed and negative.      Objective       Current Facility-Administered Medications:   •  carBAMazepine (TEGretol) tablet 200 mg, 200 mg, Oral, Q12H, Maria Elena Jung MD, 200 mg at 10/05/17 2223  •  cefepime (MAXIPIME) 2 g/100 mL 0.9% NS (mbp), 2 g, Intravenous, Q12H, WILDER Matthews, RPH, 2 g at 10/06/17 0525  •  dextrose (D50W) solution 25 g, 25 g, Intravenous, Q15 Min PRN, Jie Jimenez MD  •  dextrose (GLUTOSE) oral gel 15 g, 15 g, Oral, Q15 Min PRN, Jie Jimenez MD  •  enoxaparin (LOVENOX) syringe 40 mg, 40 mg, Subcutaneous, Daily, Jie Jimenez MD, 40 mg at 10/05/17 1023  •  glucagon (GLUCAGEN) injection 1 mg, 1 mg, Subcutaneous, Q15 Min PRN, Jie Jimenez MD  •  hydrALAZINE (APRESOLINE) injection 10 mg, 10 mg, Intravenous, Q6H PRN, REN Wilkins  •  insulin lispro (humaLOG) injection 0-7 Units, 0-7 Units, Subcutaneous, 4x Daily AC & at Bedtime, Jie Jimenez MD, 2 Units at 10/05/17 2223  •  sodium chloride 0.9 % flush 1-10 mL, 1-10 mL, Intravenous, PRN, Jie Jimenez MD    Vital Sign Min/Max for last 24 hours  Temp  Min: 98.3 °F (36.8 °C)  Max: 98.6 °F (37 °C)   BP  Min: 137/59  Max: 153/71   Pulse  Min: 81  Max: 94   Resp  Min: 18  Max: 20   No Data Recorded   Flow (L/min)  Min: 1  Max: 1   No Data Recorded     Flowsheet Rows         First Filed Value    Admission Height  66\" (167.6 cm) Documented at 10/03/2017 0915    Admission Weight  166 lb (75.3 kg) Documented at 10/03/2017 0915    "         Intake/Output Summary (Last 24 hours) at 10/06/17 0751  Last data filed at 10/05/17 1722   Gross per 24 hour   Intake              580 ml   Output                0 ml   Net              580 ml       Physical Exam:     General Appearance:    Alert, cooperative, in no acute distress   Lungs:     CTAB, no wheezes, rhonchi or rales.     Heart:    RRR NL S1 S2,  S4   Chest Wall:    No abnormalities observed, tender to palpation.    Abdomen:     Normal bowel sounds, no masses, no organomegaly, soft        non-tender, non-distended, no guarding, no rebound                tenderness   Extremities:   Moves all extremities well, no edema, no cyanosis, no             redness   Pulses:   Pulses palpable and equal bilaterally +2 pedal pulses   Skin:   No bleeding, bruising or rash        Results Review:     Results from last 7 days  Lab Units 10/03/17  1224   WBC 10*3/mm3 10.16   HEMOGLOBIN g/dL 11.1*   HEMATOCRIT % 33.9*   PLATELETS 10*3/mm3 234       Results from last 7 days  Lab Units 10/03/17  1224   SODIUM mmol/L 138   POTASSIUM mmol/L 4.3   CHLORIDE mmol/L 107   CO2 mmol/L 29.0   BUN mg/dL 13   CREATININE mg/dL 0.80   GLUCOSE mg/dL 102*                                Intake/Output Summary (Last 24 hours) at 10/06/17 0751  Last data filed at 10/05/17 1722   Gross per 24 hour   Intake              580 ml   Output                0 ml   Net              580 ml       I personally viewed and interpreted the patient's EKG/Telemetry data    EKG: 10/3/17: NSR 83 bpm, LBBB    Telemetry: NSR    Ejection Fraction  No results found for: EF    Echo EF Estimated  No results found for: ECHOEFEST      Present on Admission:  • Syncope with collapse  • LBBB (left bundle branch block)  • Seizure disorder  • RENATA treated with BiPAP  • Acute respiratory failure with hypoxia (POA)  • Essential hypertension  • Malignant neoplasm of left lung s/p radiation    Assessment/Plan   1.  Recurrent Syncope, without injury:                        -   To date, no documented arrhythmias or pauses. Recent event monitor reviewed with no evidence of bradycardia/pausease    - Neurology following, MRI brain unremarkable.                          2.   Ventricular fibrillation                        -  tele strips from EMS obtained and show possible VT/VF vs noise- difficult to interpret, poor quality                        -  Patient did receive CPR and shock ×1.                        -  Essentially normal coronary arteries on [facility] 9/30/17, normal EF. Echocardiogram 9/27/17 EF 65%     3.  Left bundle branch block     4.  Pneumonia                        -  on antibiotics per hospitalists.      5.  Seizure disorder/syncope                        -  Diagnosed 20 years ago.  On chronic Tegretol. Neurology following     6.  Essential hypertension                        -  with labile readings, possible etiology of recurrent syncope     7.  RENATA                        -  Continue with BiPAP     8.  Left lower lobe lung carcinoma                        -  Status post radiation ×5, March 2017.  First CT s/p radiation showed improvement.  Next CT scheduled for October 24.                        -  Followed by Dr. Duffy, [facility].      9. Dementia        MAXINE Escobedo  10/06/17  7:51 AM     Had discussion with son today at patient's bedside.  Complicated situation in that limited availability and interpretation of the rhythm strip by EMS (may be noise).  It would be unusual that a patient with normal LV size and function and no significant coronary artery disease have a VF arrest in the setting of normal QT interval and no other risk factors for sudden cardiac death.  In addition, I do not believe that the patient is a good candidate for an ICD with her dementia, and history of lung carcinoma.  Neurology started a second antiseizure medication today as I believe that her two-year history of recurrent syncope may be seizure related..  Monitor over the  weekend on telemetry for any further recurrent near syncopal or syncopal episodes.  Will make final disposition on Monday.  At this point there is no indication for permanent pacemaker implantation    I, Jatin Jesus MD, personally performed the services face to face as described and documented by the above named individual. I have made any necessary edits and it is both accurate and complete 10/6/2017  4:36 PM

## 2017-10-06 NOTE — THERAPY TREATMENT NOTE
Acute Care - Physical Therapy Treatment Note  [facility]     Patient Name: Sarah Beck  : 1935  MRN: [id number]  Today's Date: 10/6/2017  Onset of Illness/Injury or Date of Surgery Date: 10/03/17  Date of Referral to PT: 10/04/17  Referring Physician: MD GRUPO    Admit Date: 10/3/2017    Visit Dx:    ICD-10-CM ICD-9-CM   1. Pharyngeal dysphagia R13.13 787.23   2. Impaired mobility and ADLs Z74.09 799.89   3. Impaired functional mobility, balance, gait, and endurance Z74.09 V49.89     Patient Active Problem List   Diagnosis   • Syncope with collapse   • LBBB (left bundle branch block)   • Seizure disorder   • RENATA treated with BiPAP   • Acute respiratory failure with hypoxia (POA)   • Essential hypertension   • Malignant neoplasm of left lung s/p radiation   • Bilateral pneumonia               Adult Rehabilitation Note       10/06/17 1425 10/05/17 1300       Rehab Assessment/Intervention    Discipline physical therapy assistant  -UD speech language pathologist  -HG     Document Type therapy note (daily note)  -UD therapy note (daily note)  -HG     Subjective Information agree to therapy;complains of;weakness;dyspnea  -UD agree to therapy  -HG     Patient Effort, Rehab Treatment good  -UD excellent  -HG     Symptoms Noted During/After Treatment fatigue  -UD      Precautions/Limitations fall precautions;oxygen therapy device and L/min  -UD      Recorded by [UD] Eula Hernandez PTA [HG] Yanet Ruano, MS CCC-SLP     Vital Signs    Pre SpO2 (%) 94  -UD      O2 Delivery Pre Treatment supplemental O2  -UD      Post SpO2 (%) 92  -UD      O2 Delivery Post Treatment supplemental O2  -UD      Pre Patient Position Supine  -UD      Intra Patient Position Standing  -UD      Post Patient Position Supine  -UD      Recorded by [UD] Eula Hernandez PTA      Pain Assessment    Pain Assessment Cui-Jones FACES  -UD 0-10  -HG     Cui-Jones FACES Pain Rating 2  -UD      Pain Score 2  -UD 10  -HG     Post Pain Score 2  -UD       Pain Type Acute pain  -UD Acute pain  -HG     Pain Location Chest  -UD Chest   chest compressions from code on prior admit  -HG     Pain Intervention(s) Repositioned;Ambulation/increased activity  -UD      Recorded by [UD] Eula Hernandez PTA [HG] Yanet Ruano MS CCC-SLP     Vision Assessment/Intervention    Visual Impairment WFL with corrective lenses  -UD      Recorded by [UD] Eula Hernandez PTA      Cognitive Assessment/Intervention    Orientation Status oriented x 4  -UD      Follows Commands/Answers Questions 100% of the time  -UD      Personal Safety Interventions fall prevention program maintained  -UD      Recorded by [UD] Eula Hernandez PTA      Dysphagia Treatment Objectives and Progress    Dysphagia Treatment Objectives  Improve hyolaryngeal excursion;Improve tongue base & pharyngeal wall squeeze  -HG     Recorded by  [HG] Yanet Ruano MS CCC-SLP     Improve hyolaryngeal excursion    To improve hyolaryngeal excursion, patient will:  Complete head lift sustained (comment number of seconds);Complete head lift repetitive (comment number of lifts);Complete chin tuck against resistance (comment number of repetitions);80%;without cues  -HG     Status: Improve hyolaryngeal excursion  Progressing as expected  -HG     Hyolaryngeal Excursion Progress  50%;with consistent cues;following model;continue to adress  -HG     Comments: Improve hyolaryngeal excursion  Neck pain prior to exercises.   -HG     Recorded by  [HG] Yanet Ruano MS CCC-JACKELINE     Improve tongue base & pharyngeal wall squeeze    To improve tongue base & pharyngeal wall squeeze, patient will:  Complete effortful swallow;Complete tongue hold swallow;80%;without cues  -HG     Status: Improve tongue base & pharyngeal wall squeeze  Progressing as expected  -HG     Tongue Base/Pharyngeal Wall Squeeze Progress  70%;with consistent cues;following model;continue to adress  -HG     Recorded by  [HG] Yanet Ruano MS CCC-SLP     Bed Mobility,  Assessment/Treatment    Bed Mob, Supine to Sit, [place] supervision required  -UD      Recorded by [UD] Eula Hernandez PTA      Transfer Assessment/Treatment    Transfers, Sit-Stand [place] stand by assist  -UD      Transfers, Stand-Sit [place] contact guard assist  -UD      Transfers, Sit-Stand-Sit, Assist Device rolling walker  -UD      Recorded by [UD] Eula Hernandez PTA      Gait Assessment/Treatment    Gait, [place] Level minimum assist (75% patient effort)  -UD      Gait, Assistive Device rolling walker  -UD      Gait, Distance (Feet) 250  -UD      Gait, Gait Deviations forward flexed posture;step length decreased  -UD      Gait, Safety Issues supplemental O2;step length decreased  -UD      Gait, Impairments strength decreased  -UD      Gait, Comment --   cues for directions with walker  -UD      Recorded by [UD] Eula Hernandez PTA      Positioning and Restraints    Pre-Treatment Position in bed  -UD      Post Treatment Position bed  -UD      In Bed notified nsg;supine;call light within reach;with family/caregiver;side rails up x2  -UD      Recorded by [UD] Eula Hernandez PTA        User Key  (r) = Recorded By, (t) = Taken By, (c) = Cosigned By    Initials Name Effective Dates    UD Eula Hernandez PTA 06/22/15 -     HG Yanet Ruano MS CCC-SLP 06/22/15 -                 IP PT Goals       10/06/17 1450 10/05/17 1430       Bed Mobility PT LTG    Bed Mobility PT LTG, Time to Achieve  2 wks  -CD     Bed Mobility PT LTG, Activity Type  all bed mobility  -CD     Bed Mobility PT LTG, [place] Level  contact guard assist  -CD     Bed Mobility PT LTG, Outcome goal met  -UD      Transfer Training PT LTG    Transfer Training PT LTG, Activity Type  all transfers  -CD     Transfer Training PT LTG, [place] Level  supervision required  -CD     Transfer Training PT LTG, Assist Device  walker, rolling  -CD     Transfer Training PT LTG, Outcome goal ongoing  -UD      Gait Training PT LTG    Gait  Training Goal PT LTG, Time to Achieve  2 wks  -CD     Gait Training Goal PT LTG, [place] Level  supervision required  -CD     Gait Training Goal PT LTG, Assist Device  walker, rolling  -CD     Gait Training Goal PT LTG, Distance to Achieve  350  -CD     Gait Training Goal PT LTG, Outcome goal ongoing  -UD        User Key  (r) = Recorded By, (t) = Taken By, (c) = Cosigned By    Initials Name Provider Type    UD Eula Hernandez, PTA Physical Therapy Assistant    JERRICA Gomez, PT Physical Therapist          Physical Therapy Education     Title: PT OT SLP Therapies (Active)     Topic: Physical Therapy (Done)     Point: Mobility training (Done)    Learning Progress Summary    Learner Readiness Method Response Comment Documented by Status   Patient Acceptance E,D DU,NR  UD 10/06/17 1449 Done    Acceptance E VU,NR SAFETY WITH MOBILITY, BENEFITS OF OOB ACTIVITY, PROGRESSION OF POC, D/C PLANNING.  10/05/17 1429 Done   Family Acceptance E,D DU,NR  UD 10/06/17 1449 Done    Acceptance E VU,NR SAFETY WITH MOBILITY, BENEFITS OF OOB ACTIVITY, PROGRESSION OF POC, D/C PLANNING.  10/05/17 1429 Done               Point: Home exercise program (Done)    Learning Progress Summary    Learner Readiness Method Response Comment Documented by Status   Patient Acceptance E,D DU,NR  UD 10/06/17 1449 Done    Acceptance E VU,NR SAFETY WITH MOBILITY, BENEFITS OF OOB ACTIVITY, PROGRESSION OF POC, D/C PLANNING.  10/05/17 1429 Done   Family Acceptance E,D DU,NR  UD 10/06/17 1449 Done    Acceptance E VU,NR SAFETY WITH MOBILITY, BENEFITS OF OOB ACTIVITY, PROGRESSION OF POC, D/C PLANNING.  10/05/17 1429 Done               Point: Body mechanics (Done)    Learning Progress Summary    Learner Readiness Method Response Comment Documented by Status   Patient Acceptance E,D DU,NR  UD 10/06/17 1449 Done    Acceptance E VU,NR SAFETY WITH MOBILITY, BENEFITS OF OOB ACTIVITY, PROGRESSION OF POC, D/C PLANNING.  10/05/17 1429 Done   Family Acceptance  E,D DU,NR   10/06/17 1449 Done    Acceptance E VU,NR SAFETY WITH MOBILITY, BENEFITS OF OOB ACTIVITY, PROGRESSION OF POC, D/C PLANNING.  10/05/17 1429 Done               Point: Precautions (Done)    Learning Progress Summary    Learner Readiness Method Response Comment Documented by Status   Patient Acceptance E,D DU,NR  UD 10/06/17 1449 Done    Acceptance E VU,NR SAFETY WITH MOBILITY, BENEFITS OF OOB ACTIVITY, PROGRESSION OF POC, D/C PLANNING.  10/05/17 1429 Done   Family Acceptance E,D DU,NR   10/06/17 1449 Done    Acceptance E VU,NR SAFETY WITH MOBILITY, BENEFITS OF OOB ACTIVITY, PROGRESSION OF POC, D/C PLANNING.  10/05/17 1429 Done                      User Key     Initials Effective Dates Name Provider Type Discipline     06/22/15 -  Eula Hernandez, PTA Physical Therapy Assistant PT     06/19/15 -  Alisha Gomez, PT Physical Therapist PT                    PT Recommendation and Plan  Planned Therapy Interventions: balance training, bed mobility training, gait training, home exercise program, strengthening, transfer training  PT Frequency: daily  Plan of Care Review  Plan Of Care Reviewed With: patient, family  Progress: improving  Outcome Summary/Follow up Plan: pt gets some sob.needs cues for walker.ambulat 250 ft.          Outcome Measures       10/06/17 1425 10/05/17 0932 10/05/17 0930    How much help from another person do you currently need...    Turning from your back to your side while in flat bed without using bedrails? 3  -UD  2  -CD    Moving from lying on back to sitting on the side of a flat bed without bedrails? 4  -UD  2  -CD    Moving to and from a bed to a chair (including a wheelchair)? 3  -UD  3  -CD    Standing up from a chair using your arms (e.g., wheelchair, bedside chair)? 3  -UD  3  -CD    Climbing 3-5 steps with a railing? 2  -UD  2  -CD    To walk in hospital room? 3  -UD  3  -CD    AM-PAC 6 Clicks Score 18  -UD  15  -CD    How much help from another is currently needed...     Putting on and taking off regular lower body clothing?  2  -AN     Bathing (including washing, rinsing, and drying)  2  -AN     Toileting (which includes using toilet bed pan or urinal)  3  -AN     Putting on and taking off regular upper body clothing  3  -AN     Taking care of personal grooming (such as brushing teeth)  3  -AN     Eating meals  4  -AN     Score  17  -AN     Functional Assessment    Outcome Measure Options  AM-PAC 6 Clicks Daily Activity (OT)  -AN AM-PAC 6 Clicks Basic Mobility (PT)  -CD      User Key  (r) = Recorded By, (t) = Taken By, (c) = Cosigned By    Initials Name Provider Type    DOMINGA Hernandez PTA Physical Therapy Assistant    JERRICA Gomez, PT Physical Therapist    GIOVANNY Mcnamara, OT Occupational Therapist           Time Calculation:         PT Charges       10/06/17 1452          Time Calculation    PT Received On 10/06/17  -      PT Goal Re-Cert Due Date 10/15/17  -      Time Calculation- PT    Total Timed Code Minutes- PT 15 minute(s)  -        User Key  (r) = Recorded By, (t) = Taken By, (c) = Cosigned By    Initials Name Provider Type    DOMINGA Hernandez PTA Physical Therapy Assistant          Therapy Charges for Today     Code Description Service Date Service Provider Modifiers Qty    16206772216 HC GAIT TRAINING EA 15 MIN 10/6/2017 Eula Hernandez PTA GP 1          PT G-Codes  Outcome Measure Options: AM-PAC 6 Clicks Daily Activity (OT)    Eula Hernandez PTA  10/6/2017

## 2017-10-07 LAB
GLUCOSE BLDC GLUCOMTR-MCNC: 113 MG/DL (ref 70–130)
GLUCOSE BLDC GLUCOMTR-MCNC: 122 MG/DL (ref 70–130)
GLUCOSE BLDC GLUCOMTR-MCNC: 125 MG/DL (ref 70–130)
GLUCOSE BLDC GLUCOMTR-MCNC: 89 MG/DL (ref 70–130)

## 2017-10-07 PROCEDURE — 25010000002 CEFEPIME

## 2017-10-07 PROCEDURE — 99232 SBSQ HOSP IP/OBS MODERATE 35: CPT | Performed by: INTERNAL MEDICINE

## 2017-10-07 PROCEDURE — 82962 GLUCOSE BLOOD TEST: CPT

## 2017-10-07 PROCEDURE — 25010000002 ENOXAPARIN PER 10 MG: Performed by: INTERNAL MEDICINE

## 2017-10-07 RX ADMIN — ENOXAPARIN SODIUM 40 MG: 40 INJECTION SUBCUTANEOUS at 08:49

## 2017-10-07 RX ADMIN — CEFEPIME 2 G: 2 INJECTION, POWDER, FOR SOLUTION INTRAVENOUS at 06:18

## 2017-10-07 RX ADMIN — CARBAMAZEPINE 200 MG: 200 TABLET ORAL at 21:33

## 2017-10-07 RX ADMIN — LEVETIRACETAM 250 MG: 250 TABLET, FILM COATED ORAL at 08:49

## 2017-10-07 RX ADMIN — LEVETIRACETAM 250 MG: 250 TABLET, FILM COATED ORAL at 21:33

## 2017-10-07 RX ADMIN — CARBAMAZEPINE 200 MG: 200 TABLET ORAL at 08:49

## 2017-10-07 RX ADMIN — CEFEPIME 2 G: 2 INJECTION, POWDER, FOR SOLUTION INTRAVENOUS at 17:29

## 2017-10-07 NOTE — PROGRESS NOTES
[facility]       LOS: 4 days   Patient Care Team:  Damian Miller MD as PCP - General (General Practice)        Subjective       Patient Denies:  Sob, palps.  Chest sore      Vital Signs  Temp:  [97.3 °F (36.3 °C)] 97.3 °F (36.3 °C)  Heart Rate:  [77] 77  Resp:  [16] 16  BP: (153)/(61) 153/61    Intake/Output Summary (Last 24 hours) at 10/07/17 1009  Last data filed at 10/07/17 0855   Gross per 24 hour   Intake              460 ml   Output                0 ml   Net              460 ml     I/O this shift:  In: 360 [P.O.:360]  Out: -     Physical Exam:     General Appearance:    Alert, cooperative, in no acute distress       Neck:   No adenopathy, supple, trachea midline, no JVD       Lungs:     Clear to auscultation,respirations regular, even and                  unlabored    Heart:    Regular rhythm and normal rate, normal S1 and S2, no            murmur, no gallop, no rub, no click   Chest Wall:    No abnormalities observed   Abdomen:     Normal bowel sounds, no masses, no organomegaly, soft        non-tender, non-distended       Extremities:   Moves all extremities well, no edema, no cyanosis, no             redness   Pulses:   Pulses palpable and equal bilaterally     Results Review:     I reviewed the patient's new clinical results.      WBC No results found for: WBC         HGB No results found for: HGB        HCT No results found for: HCT         Platlets No results found for: LABPLAT  SodiumNo results found for: NA  PotassiumNo results found for: K  ChlorideNo results found for: CL  BicarbonateNo results found for: PLASMABICARB    BUN No results found for: BUN   Creatinine No results found for: CREATININE   Calcium No results found for: CALCIUM   Mag No results found for: MG        PT/INR:  No results found for: PROTIME/No results found for: INR  Troponin I   No results found for: CKTOTAL, CKMB, CKMBINDEX, TROPONINI, TROPONINT      carBAMazepine  200 mg Oral Q12H   cefepime 2 g Intravenous Q12H   enoxaparin 40 mg Subcutaneous Daily   insulin lispro 0-7 Units Subcutaneous 4x Daily AC & at Bedtime   levETIRAcetam 250 mg Oral Q12H          Assessment/Plan     Patient Active Problem List   Diagnosis Code   • Syncope with collapse R55   • LBBB (left bundle branch block) I44.7   • Seizure disorder G40.909   • RENATA treated with BiPAP G47.33   • Acute respiratory failure with hypoxia (POA) J96.01   • Essential hypertension I10   • Malignant neoplasm of left lung s/p radiation C34.92   • Bilateral pneumonia J18.9     Observe on tele  Will see Monday    MAXINE Ramirez  10/07/17  10:09 AM

## 2017-10-07 NOTE — PLAN OF CARE
Problem: Patient Care Overview (Adult)  Goal: Plan of Care Review  Outcome: Ongoing (interventions implemented as appropriate)    10/07/17 1629   Coping/Psychosocial Response Interventions   Plan Of Care Reviewed With patient   Patient Care Overview   Progress improving   Outcome Evaluation   Outcome Summary/Follow up Plan pt lathargic today, on room air kyle well        Goal: Adult Individualization and Mutuality  Outcome: Ongoing (interventions implemented as appropriate)  Goal: Discharge Needs Assessment  Outcome: Ongoing (interventions implemented as appropriate)    Problem: Fall Risk (Adult)  Goal: Absence of Falls  Outcome: Ongoing (interventions implemented as appropriate)    Problem: Older Inpatient, Acutely Ill (Adult)  Goal: Signs and Symptoms of Listed Potential Problems Will be Absent or Manageable (Older Inpatient, Acutely Ill)  Outcome: Ongoing (interventions implemented as appropriate)

## 2017-10-07 NOTE — PROGRESS NOTES
"      HOSPITALIST DAILY PROGRESS NOTE    Chief Complaint: f/u syncope    Subjective   SUBJECTIVE/OVERNIGHT EVENTS   Patient had an ok night, she is without any complain,feels a touch tired.    Review of Systems:  Gen-no fevers, no chills  CV-no chest pain, no palpitations  Resp-no cough, no dyspnea  GI-no N/V/D, no abd pain    Otherwise complete ROS is negative except as mentioned in the HPI.    Objective   OBJECTIVE   I have reviewed the vital signs.  /61 (BP Location: Left arm, Patient Position: Lying)  Pulse 77  Temp 97.3 °F (36.3 °C) (Oral)   Resp 16  Ht 66\" (167.6 cm)  Wt 166 lb 9.6 oz (75.6 kg)  SpO2 95%  BMI 26.89 kg/m2    Physical Exam:  Gen-no acute distress, seated in bed comfortable  CV-RRR, S1 S2 normal, no m/r/g  Resp-CTAB, no wheezes  Abd-soft, NT, ND, +BS  Ext-no edema,  Neuro-A&Ox3, no focal deficits  Psych-appropriate mood and affect    Results:  I have reviewed the labs      Results from last 7 days  Lab Units 10/03/17  1224   WBC 10*3/mm3 10.16   HEMOGLOBIN g/dL 11.1*   HEMATOCRIT % 33.9*   PLATELETS 10*3/mm3 234       Results from last 7 days  Lab Units 10/03/17  1224   SODIUM mmol/L 138   POTASSIUM mmol/L 4.3   CHLORIDE mmol/L 107   CO2 mmol/L 29.0   BUN mg/dL 13   CREATININE mg/dL 0.80   GLUCOSE mg/dL 102*   CALCIUM mg/dL 8.9     I have reviewed the medications.    Assessment/Plan   ASSESSMENT/PLAN    Principal Problem:    Syncope with collapse  Active Problems:    LBBB (left bundle branch block)    Seizure disorder    RENATA treated with BiPAP    Acute respiratory failure with hypoxia (POA)    Essential hypertension    Malignant neoplasm of left lung s/p radiation    Bilateral pneumonia    82 yof with a hx of CVA with residual generalized weakness, well controlled diabetes A1C 5.8%, RENATA on bipap, hx pf lung CA s/p radiation followed at , seizure disorder and two years of syncopal episodes, she was transferred here for possible leadless pacemaker placement.      Plan:  - Hx of " syncope with collapse, multiple episodes in the past, etiology unknown, extensive w/u (data deficient) unrevealing, suspected PE though CTA negative, episodes preceded by seizure like activity, neurology consulted, their impression is this was likely cardiac, however they are adjusting sz rx. MRI head wnl  - Patient with LBBB, s/p VT/Vfib (data deficient, but seems less likely considering patient has clean coronaries and normal LV function and EF, cardiology following no plans for PM placement at this time, final decision 10/8  - Suspect patient has some autonomic dysfunction since her BP has been labile per family and is mostly low during her syncopal episodes. Her BP rx also need reviewing  - Bilateral LL consolidation and opacifications, concerns for aspiration pna, continue cefepime, she has been on abx ~11 days would complete 14 days course, end date 10/9/2017, SLP has evaluated, likely with mild dysphagia, rec reg texture/thin liquids  - Hx of lung CA s/p radiation x5, no chemo followed at , with   - Hx of sz disorder, continue home rx, carbamazepine level appropriate, neuro has added keppra  - Hx of well controlled DM A1C 5.8 continue SSI  - Hx of RENATA continue home bipap  - DVT prophylaxis: Lovenox      Dispo: anticipate d/c to rehab, [facility] following.    Complex case    Jie Jimenez MD  10/07/17  9:09 AM

## 2017-10-08 LAB
GLUCOSE BLDC GLUCOMTR-MCNC: 124 MG/DL (ref 70–130)
GLUCOSE BLDC GLUCOMTR-MCNC: 128 MG/DL (ref 70–130)
GLUCOSE BLDC GLUCOMTR-MCNC: 130 MG/DL (ref 70–130)
GLUCOSE BLDC GLUCOMTR-MCNC: 193 MG/DL (ref 70–130)

## 2017-10-08 PROCEDURE — 99232 SBSQ HOSP IP/OBS MODERATE 35: CPT | Performed by: INTERNAL MEDICINE

## 2017-10-08 PROCEDURE — 25010000002 ENOXAPARIN PER 10 MG: Performed by: INTERNAL MEDICINE

## 2017-10-08 PROCEDURE — 25010000002 CEFEPIME

## 2017-10-08 PROCEDURE — 82962 GLUCOSE BLOOD TEST: CPT

## 2017-10-08 RX ORDER — ACETAMINOPHEN 500 MG
1000 TABLET ORAL EVERY 6 HOURS PRN
Status: DISCONTINUED | OUTPATIENT
Start: 2017-10-08 | End: 2017-10-09 | Stop reason: HOSPADM

## 2017-10-08 RX ORDER — IPRATROPIUM BROMIDE AND ALBUTEROL SULFATE 2.5; .5 MG/3ML; MG/3ML
3 SOLUTION RESPIRATORY (INHALATION) EVERY 4 HOURS PRN
Status: DISCONTINUED | OUTPATIENT
Start: 2017-10-08 | End: 2017-10-09 | Stop reason: HOSPADM

## 2017-10-08 RX ORDER — IPRATROPIUM BROMIDE AND ALBUTEROL SULFATE 2.5; .5 MG/3ML; MG/3ML
3 SOLUTION RESPIRATORY (INHALATION)
Status: DISCONTINUED | OUTPATIENT
Start: 2017-10-08 | End: 2017-10-08

## 2017-10-08 RX ADMIN — CEFEPIME 2 G: 2 INJECTION, POWDER, FOR SOLUTION INTRAVENOUS at 17:21

## 2017-10-08 RX ADMIN — INSULIN LISPRO 2 UNITS: 100 INJECTION, SOLUTION INTRAVENOUS; SUBCUTANEOUS at 21:01

## 2017-10-08 RX ADMIN — CARBAMAZEPINE 200 MG: 200 TABLET ORAL at 08:49

## 2017-10-08 RX ADMIN — ACETAMINOPHEN 1000 MG: 500 TABLET ORAL at 17:21

## 2017-10-08 RX ADMIN — LEVETIRACETAM 250 MG: 250 TABLET, FILM COATED ORAL at 21:01

## 2017-10-08 RX ADMIN — CARBAMAZEPINE 200 MG: 200 TABLET ORAL at 21:04

## 2017-10-08 RX ADMIN — ENOXAPARIN SODIUM 40 MG: 40 INJECTION SUBCUTANEOUS at 08:49

## 2017-10-08 RX ADMIN — LEVETIRACETAM 250 MG: 250 TABLET, FILM COATED ORAL at 08:49

## 2017-10-08 RX ADMIN — CEFEPIME 2 G: 2 INJECTION, POWDER, FOR SOLUTION INTRAVENOUS at 05:06

## 2017-10-08 NOTE — PLAN OF CARE
Problem: Patient Care Overview (Adult)  Goal: Plan of Care Review  Outcome: Ongoing (interventions implemented as appropriate)    10/08/17 1701   Coping/Psychosocial Response Interventions   Plan Of Care Reviewed With patient   Patient Care Overview   Progress no change   Outcome Evaluation   Outcome Summary/Follow up Plan pt very lethargic arouses to voice and is oriented, pt kyle room air keeping sats greater than 92%, may Dc tomorrow        Goal: Adult Individualization and Mutuality  Outcome: Ongoing (interventions implemented as appropriate)  Goal: Discharge Needs Assessment  Outcome: Ongoing (interventions implemented as appropriate)    Problem: Fall Risk (Adult)  Goal: Absence of Falls  Outcome: Ongoing (interventions implemented as appropriate)    Problem: Older Inpatient, Acutely Ill (Adult)  Goal: Signs and Symptoms of Listed Potential Problems Will be Absent or Manageable (Older Inpatient, Acutely Ill)  Outcome: Ongoing (interventions implemented as appropriate)

## 2017-10-08 NOTE — PLAN OF CARE
Problem: Patient Care Overview (Adult)  Goal: Plan of Care Review  Outcome: Ongoing (interventions implemented as appropriate)  Goal: Adult Individualization and Mutuality  Outcome: Ongoing (interventions implemented as appropriate)  Goal: Discharge Needs Assessment  Outcome: Ongoing (interventions implemented as appropriate)    Problem: Fall Risk (Adult)  Goal: Absence of Falls  Outcome: Ongoing (interventions implemented as appropriate)    Problem: Older Inpatient, Acutely Ill (Adult)  Goal: Signs and Symptoms of Listed Potential Problems Will be Absent or Manageable (Older Inpatient, Acutely Ill)  Outcome: Ongoing (interventions implemented as appropriate)

## 2017-10-08 NOTE — PROGRESS NOTES
"      HOSPITALIST DAILY PROGRESS NOTE    Chief Complaint: f/u for syncope    Subjective   SUBJECTIVE/OVERNIGHT EVENTS   Patient had a episode of confusion yesterday afternoon, vitals stable, this morning she states that she is doing ok, just tired does endorse some     Review of Systems:  Gen-no fevers, no chills  CV-no chest pain, no palpitations  Resp-+cough,+ dyspnea  GI-no N/V/D, no abd pain    Otherwise complete ROS is negative except as mentioned in the HPI.    Objective   OBJECTIVE   I have reviewed the vital signs.  /69 (BP Location: Left arm, Patient Position: Lying)  Pulse 78  Temp 98.8 °F (37.1 °C) (Oral)   Resp 16  Ht 66\" (167.6 cm)  Wt 166 lb 9.6 oz (75.6 kg)  SpO2 95%  BMI 26.89 kg/m2    Physical Exam:  Gen-no acute distress, comfortable in bed  CV-RRR, S1 S2 normal, no m/r/g  Resp-CTAB, no wheezes  Abd-soft, NT, ND, +BS  Ext-no edema  Neuro-A&Ox3, no focal deficits  Psych-appropriate mood and affect    Results:  I have reviewed the labs.    Results from last 7 days  Lab Units 10/03/17  1224   WBC 10*3/mm3 10.16   HEMOGLOBIN g/dL 11.1*   HEMATOCRIT % 33.9*   PLATELETS 10*3/mm3 234       Results from last 7 days  Lab Units 10/03/17  1224   SODIUM mmol/L 138   POTASSIUM mmol/L 4.3   CHLORIDE mmol/L 107   CO2 mmol/L 29.0   BUN mg/dL 13   CREATININE mg/dL 0.80   GLUCOSE mg/dL 102*   CALCIUM mg/dL 8.9     I have reviewed the medications.    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      Plan:  - Hx of syncope with collapse, multiple episodes in the past 2 yrs, etiology unknown, extensive w/u (data deficient) unrevealing, suspected PE though CTA negative, episodes preceded by seizure like activity, neurology consulted, their impression is this was likely cardiac, however they are adjusting sz rx. MRI head wnl  - Patient with LBBB, s/p VT/Vfib (data deficient, but seems less likely considering patient is sp/ [facility] with clean coronaries and normal LV function and EF, cardiology following no plans for PM placement at this time, final decision 10/9 per Ann Marie  - I suspect patient has some autonomic dysfunction since her BP has been labile per family and is mostly low during her syncopal episodes. Her BP rx also needs reviewing at d/c  - Bilateral LL consolidation and opacifications, concerns for aspiration pna, continue cefepime, she has been on abx since 10/26, would complete 14 days course, end date 10/9/2017, SLP has evaluated, likely with mild dysphagia, rec reg texture/thin liquids  - Hx of lung CA s/p radiation x5, no chemo followed at , with , has f/u Oct 24th, will need copy CT chest at d/c  - Hx of sz disorder, continue home rx, carbamazepine levels appropriate, neuro has added keppra  - Hx of well controlled DM A1C 5.8 continue SSI  - Hx of RENATA continue home bipap  - DVT prophylaxis: Lovenox      Dispo: anticipate d/c to rehab, [facility] following.     Complex case    Jie Jimenez MD  10/08/17  8:58 AM

## 2017-10-09 VITALS
TEMPERATURE: 98.2 F | OXYGEN SATURATION: 95 % | WEIGHT: 175.4 LBS | HEIGHT: 66 IN | BODY MASS INDEX: 28.19 KG/M2 | RESPIRATION RATE: 18 BRPM | SYSTOLIC BLOOD PRESSURE: 165 MMHG | HEART RATE: 81 BPM | DIASTOLIC BLOOD PRESSURE: 73 MMHG

## 2017-10-09 LAB
DEPRECATED RDW RBC AUTO: 52.5 FL (ref 37–54)
ERYTHROCYTE [DISTWIDTH] IN BLOOD BY AUTOMATED COUNT: 15.6 % (ref 11.3–14.5)
GLUCOSE BLDC GLUCOMTR-MCNC: 103 MG/DL (ref 70–130)
GLUCOSE BLDC GLUCOMTR-MCNC: 143 MG/DL (ref 70–130)
HCT VFR BLD AUTO: 33.3 % (ref 34.5–44)
HGB BLD-MCNC: 10.6 G/DL (ref 11.5–15.5)
MCH RBC QN AUTO: 29.8 PG (ref 27–31)
MCHC RBC AUTO-ENTMCNC: 31.8 G/DL (ref 32–36)
MCV RBC AUTO: 93.5 FL (ref 80–99)
PLATELET # BLD AUTO: 271 10*3/MM3 (ref 150–450)
PMV BLD AUTO: 9.5 FL (ref 6–12)
RBC # BLD AUTO: 3.56 10*6/MM3 (ref 3.89–5.14)
WBC NRBC COR # BLD: 9.14 10*3/MM3 (ref 3.5–10.8)

## 2017-10-09 PROCEDURE — 25010000002 ENOXAPARIN PER 10 MG: Performed by: INTERNAL MEDICINE

## 2017-10-09 PROCEDURE — 82962 GLUCOSE BLOOD TEST: CPT

## 2017-10-09 PROCEDURE — 25010000002 CEFEPIME

## 2017-10-09 PROCEDURE — 97530 THERAPEUTIC ACTIVITIES: CPT

## 2017-10-09 PROCEDURE — 99239 HOSP IP/OBS DSCHRG MGMT >30: CPT | Performed by: NURSE PRACTITIONER

## 2017-10-09 PROCEDURE — 97116 GAIT TRAINING THERAPY: CPT

## 2017-10-09 PROCEDURE — 85027 COMPLETE CBC AUTOMATED: CPT | Performed by: PHYSICIAN ASSISTANT

## 2017-10-09 RX ORDER — LEVETIRACETAM 250 MG/1
250 TABLET ORAL EVERY 12 HOURS SCHEDULED
Qty: 60 TABLET | Refills: 0
Start: 2017-10-09

## 2017-10-09 RX ADMIN — ENOXAPARIN SODIUM 40 MG: 40 INJECTION SUBCUTANEOUS at 08:16

## 2017-10-09 RX ADMIN — CEFEPIME 2 G: 2 INJECTION, POWDER, FOR SOLUTION INTRAVENOUS at 06:05

## 2017-10-09 RX ADMIN — CARBAMAZEPINE 200 MG: 200 TABLET ORAL at 08:16

## 2017-10-09 RX ADMIN — LEVETIRACETAM 250 MG: 250 TABLET, FILM COATED ORAL at 08:16

## 2017-10-09 NOTE — DISCHARGE PLACEMENT REQUEST
"Armando Medley (82 y.o. Female)     Date of Birth Social Security Number Address Home Phone MRN    [id number]  [id number] [id number] [phone] [id number]    Muslim Marital Status          Congregational        Admission Date Admission Type Admitting Provider Attending Provider Department, Room/Bed    10/3/17 Elective Clair Hooper MD Reddy, Mayuri V, MD [address], S451/1    Discharge Date Discharge Disposition Discharge Destination         Skilled Nursing Facility (DC - External)             Attending Provider: Clair GUY MD     Allergies:  Quinolones, Vancomycin, Aluminum-containing Compounds, Amoxicillin, Ciprofloxacin, Levofloxacin, Prednisone, Shrimp Flavor, Cefazolin, Contrast Dye, Diclofenac, Etodolac, Ibuprofen, Indomethacin, Ketorolac, Meclofenamate, Mefenamic Acid, Meloxicam, Misoprostol, Naproxen, Oxaprozin, Piroxicam, Sulindac    Isolation:  None   Infection:  None   Code Status:  FULL    Ht:  66\" (167.6 cm)   Wt:  175 lb 6.4 oz (79.6 kg)    Admission Cmt:  None   Principal Problem:  Syncope with collapse [R55]                 Active Insurance as of 10/3/2017     Primary Coverage     Payor Plan Insurance Group Employer/Plan Group    MEDICARE MEDICARE A & B      Payor Plan Address Payor Plan Phone Number Effective From Effective To    [address] [phone] 5/1/[address]     [place] [address]       Subscriber Name Subscriber Birth Date Member ID       ARMANDO MEDLEY 1935 [id number]           Secondary Coverage     Payor Plan Insurance Group Employer/Plan Group    AARP MED SUPP AAR HEALTH CARE OPTIONS      Payor Plan Address Payor Plan Phone Number Effective From Effective To    [facility] [phone] 1/1/2017     [id number]       [place] [address]       Subscriber Name Subscriber Birth Date Member ID       ARMANDO MEDLEY 1935 [id number]                 Emergency Contacts      (Rel.) Home Phone Work Phone " Mobile Phone    John Beck (Spouse) -- -- [phone]    Jessica Wallace (Other) -- -- [phone]                 Discharge Summary      Violetta Vincent APRAMY at 10/9/2017 12:38 PM              [facility]  DISCHARGE SUMMARY       Date of Admission: 10/3/2017  Date of Discharge:  10/9/2017  Primary Care Physician: Damian Miller MD  Consulting Physician(s)     Provider Relationship Specialty    Jatin Jesus MD Consulting Physician Cardiology    Maria Elena Jung MD Consulting Physician Neurology          Discharge Diagnoses:  Active Hospital Problems (** Indicates Principal Problem)    Diagnosis Date Noted   • **Syncope with collapse [R55] 10/03/2017   • LBBB (left bundle branch block) [I44.7] 10/03/2017   • Seizure disorder [G40.909] 10/03/2017   • RENATA treated with BiPAP [G47.33] 10/03/2017   • Acute respiratory failure with hypoxia (POA) [J96.01] 10/03/2017   • Essential hypertension [I10] 10/03/2017   • Malignant neoplasm of left lung s/p radiation [C34.92] 10/03/2017   • Bilateral pneumonia [J18.9] 10/03/2017      Resolved Hospital Problems    Diagnosis Date Noted Date Resolved   No resolved problems to display.       Presenting Problem/History of Present Illness  Syncope [R55]     Chief Complaint on Day of Discharge:  FU syncope    History of Present Illness on Day of Discharge:   Patient resting comfortably in bed this morning.  No new complaints other than some drowsiness.  Family at bedside.      Hospital Course  Patient is a 82 y.o. female with a history of CVA with residual generalized weakness, diabetes, obstructive sleep apnea on BiPAP, a history of lung cancer status post radiation followed by , seizure disorder on Tegretol, and 2 years of regular syncopal episodes.  She presented from an outside facility after being admitted there 9/26/2017 status post CODE BLUE at home.  The patient reported that she was seated in a chair and passed out and had some  jerks and was unresponsive requiring chest compressions.  When EMS arrived they found her to be in V. fib/V. tach and she was shocked once.  She was intubated and transferred to the outside facilities ICU and subsequently underwent a left heart catheter that showed clean coronaries and a normal EF.  The patient was extubated.  She had a CT of her chest that was concerning for pneumonia and was started on antibiotics.  Her EKG showed a left bundle branch block.  The etiology of the patient's syncope was unknown and she was transferred to [facility] for a possible pacemaker placement.  The patient reported some discomfort due to her chest compressions but arrived awake, alert with some confusion.  She does have a history of Alzheimer's disease but her family reports that she has been somewhat more confused than her baseline.  She is a history of hypertension and on review of her medications takes multiple antihypertensive/diuretics.  These were held upon admission.  She was seen by cardiology, who reviewed the EKG strips from EMS as well as all of her cardiac tracings while inpatient.  They found no indication for permanent pacemaker placement or a defibrillator.  The patient had no further syncopal episodes.  She was seen by neurology and there was some concern that she may be having increased seizure activity causing her symptoms.  She was started on oral Keppra as well as continued on her Tegretol.  The patient is now medically stable and will be transferred to skilled nursing facility today for rehabilitation.  The patient will be transferred off of all of her antihypertensive medications and she will need follow-up with her primary cardiologist as an outpatient.  The exact mechanism of her syncope is still unknown.  She'll also need follow-up with her neurologist once discharged.    Procedures Performed         Consults:   Consults     Date and Time Order Name Status Description    10/4/2017 0844  Inpatient Consult to Neurology Completed           Pertinent Test Results:  Lab Results   Component Value Date    WBC 9.14 10/09/2017    HGB 10.6 (L) 10/09/2017    HCT 33.3 (L) 10/09/2017    MCV 93.5 10/09/2017     10/09/2017     Lab Results   Component Value Date    GLUCOSE 102 (H) 10/03/2017    CALCIUM 8.9 10/03/2017     10/03/2017    K 4.3 10/03/2017    CO2 29.0 10/03/2017     10/03/2017    BUN 13 10/03/2017    CREATININE 0.80 10/03/2017    EGFRIFNONA 69 10/03/2017    BCR 16.3 10/03/2017    ANIONGAP 2.0 (L) 10/03/2017     Imaging Results (last 7 days)     Procedure Component Value Units Date/Time    CT Angiogram Chest With & Without Contrast [[id number]] Collected:  10/03/17 1356     Updated:  10/03/17 1651    Narrative:       EXAMINATION: CT ANGIOGRAM CHEST W WO CONTRAST- 10/03/2017     INDICATION: syncope with collapse      TECHNIQUE: CT angiogram chest with and without intravenous contrast. 2-D  reconstructions performed.     The radiation dose reduction device was turned on for each scan per the  ALARA (As Low as Reasonably Achievable) protocol.     COMPARISON: NONE     FINDINGS: Thyroid is homogeneous in attenuation without dominant nodule  identified. No bulky mediastinal adenopathy. Cardiac size enlarged  without pericardial effusion. Atherosclerotic involvement of the aortic  arch and great vessel origins without significant luminal impact.  Nonaneurysmal thoracic aorta. No evidence for acute aortic injury,  pseudoaneurysm or dissection. Visualized portions of the abdominal aorta  demonstrate moderate to severe luminal narrowing of the celiac origin as  well as mild to moderate SMA origin narrowing. Central pulmonary  arterial system is well-opacified without filling defect to suggest  pulmonary embolus. Central airways are patent. Extended lung windows  demonstrate confluent opacifications involving the left lower lobe  superior and lateral segments with air bronchograms present.  Bilateral  small volume pleural effusions with adjacent atelectasis. Minimal  pulmonary nodularity likely reactive noted within the right upper lobe.  Multilevel degenerative changes of the spine without aggressive osseous  lesion identified. ACDF hardware in place lower cervical segments. No  aggressive soft tissue body wall findings. Visualized portions of the  upper abdomen grossly unremarkable with the exception of aforementioned  vascular findings.       Impression:       1. No evidence for acute aortic injury, pseudoaneurysm or dissection  with atherosclerotic involvement greatest in the abdominal portions  involving the celiac and SMA origins.  2. No central pulmonary filling defect to suggest pulmonary embolus.  3. Consolidative opacifications left lower lobe consistent with acute  infiltrates.  4. Bilateral small volume pleural effusions.     D:  10/03/2017  E:  10/03/2017     This report was finalized on 10/3/2017 4:49 PM by Dr. Chucho Peña.       FL Video Swallow With Speech [[id number]] Collected:  10/04/17 1543     Updated:  10/04/17 1646    Narrative:       EXAMINATION: FL VIDEO SWALLOW W SPEECH-     INDICATION: dysphagia         TECHNIQUE: 1 minute of fluoroscopic time was used for this exam. 1  associated image was saved. The patient was evaluated in the seated  lateral position while taking a variety of consistencies of barium by  mouth under the direction of speech pathology.     COMPARISON: NONE     FINDINGS: There was no penetration and no aspiration with any of the  media ingested.          Impression:       Fluoroscopy provided for a modified barium swallow. Please  see speech therapy report for full details and recommendations.         This report was finalized on 10/4/2017 4:44 PM by Dr. Chucho Peña.       MRI Brain Without Contrast [[id number]] Collected:  10/05/17 0821     Updated:  10/05/17 7002    Narrative:       EXAMINATION: MRI BRAIN WO CONTRAST-     INDICATION: Syncope;  "R13.13-Dysphagia, pharyngeal phase.      TECHNIQUE: Multiplanar MRI of the brain without intravenous contrast  administration.     COMPARISON: None.     FINDINGS: No restriction on diffusion-weighted sequences. Midline  structures are symmetric without evidence of mass, mass effect or  midline shift. Ventricles and sulci are mildly prominent in particular  within the anterior and inferior temporal regions and towards the vertex  consistent with atrophy. Moderate T2/FLAIR hyperintense signal within  the periventricular and deep white matter consistent with chronic small  vessel ischemic disease. Pituitary and sella within normal limits.  Cervicomedullary junction widely patent.  Globes and orbits retain  normal signal characteristics. Visualized paranasal sinuses and mastoid  air cells demonstrate small right mastoid effusion otherwise grossly  clear and well pneumatized. Normal signal flow voids within the distal  internal carotid and basilar arteries.       Impression:       1. No acute intracranial abnormality.  2. Moderate chronic small vessel ischemic changes and volume loss from  atrophy most prominent in the mesiotemporal region.  3. Nonspecific small right mastoid effusion.      D:  10/05/2017  E:  10/05/2017     This report was finalized on 10/5/2017 5:50 PM by Dr. Chucho Peña.           Cultures:           Condition on Discharge:  stable    Physical Exam on Discharge:/73 (BP Location: Right arm, Patient Position: Lying)  Pulse 81  Temp 98.2 °F (36.8 °C) (Oral)   Resp 18  Ht 66\" (167.6 cm)  Wt 175 lb 6.4 oz (79.6 kg)  SpO2 95%  BMI 28.31 kg/m2  Physical Exam    General: Resting in bed, NAD, family at bedside.   HEENT: Normocephalic, mucous membranes moist, pupils equal  Neck: Supple, trachea midline  Cardiovascular: Regular rate and rhythm, S1-S2, no murmur  Respiratory: Clear to auscultation bilaterally, even unlabored breathing on room air  Abdomen: Soft, nontender, nondistended, bowel sounds " +  Skin: Clean, dry, intact, no lesions or sores  Extremities: FOX, Symmetrical, pulses +, no edema noted  Neuro: Alert, oriented ×4, appropriate and cooperative,  and pedal pushes equal, sensation intact    Discharge Disposition  Skilled Nursing Facility (DC - External)    Discharge Medications   Sarah Beck   Home Medication Instructions HARIKA:[id number]    Printed on:10/09/17 1243   Medication Information                      allopurinol (ZYLOPRIM) 300 MG tablet  Take 300 mg by mouth Daily.             aspirin 81 MG EC tablet  Take 81 mg by mouth Daily.             carBAMazepine XR (TEGRETOL-XR) 200 MG 12 hr tablet  Take 200 mg by mouth 2 (Two) Times a Day.             glimepiride (AMARYL) 2 MG tablet  Take 2 mg by mouth Every Morning Before Breakfast.             levETIRAcetam (KEPPRA) 250 MG tablet  Take 1 tablet by mouth Every 12 (Twelve) Hours.             memantine (NAMENDA) 5 MG tablet  Take 5 mg by mouth 2 (Two) Times a Day.             vitamin D (ERGOCALCIFEROL) 60805 units capsule capsule  Take 50,000 Units by mouth 1 (One) Time Per Week.                 Discharge Diet:   Diet Instructions     Diet: Consistent Carbohydrate; Thin       Discharge Diet:  Consistent Carbohydrate   Fluid Consistency:  Thin                 Discharge Care Plan / Instructions:    Activity at Discharge:   Activity Instructions     Activity as Tolerated                     Follow-up Appointments  No future appointments.  Additional Instructions for the Follow-ups that You Need to Schedule     Additional Discharge Follow-Up (Specify Provider)    As directed    To:  primary cardiologist   Follow Up Details:  within a month       Discharge Follow-up with PCP    As directed    Follow Up Details:  1-2 weeks                 Test Results Pending at Discharge       REN Love 10/09/17 12:42 PM    Time: Discharge 45 min    Please note that portions of this note may have been completed with a voice recognition program.  Efforts were made to edit the dictations, but occasionally words are mistranscribed.         Electronically signed by REN Love at 10/9/2017 12:55 PM

## 2017-10-09 NOTE — PROGRESS NOTES
Continued Stay Note  [facility]     Patient Name: Sarah Beck  MRN: [id number]  Today's Date: 10/9/2017    Admit Date: 10/3/2017          Discharge Plan       10/09/17 1149    Case Management/Social Work Plan    Plan [facility]    Patient/Family In Agreement With Plan yes    Additional Comments Spoke with patient and family at bedside. Plan remains to go to [facility] for short term rehab at [place]. They are able to accept when medically ready. Nurse to call report to [phone] . DC summary has been faxed to [phone]. Family transporting. CM will follow.               Discharge Codes       10/09/17 1149    Discharge Codes    Discharge Codes 03  Discharged/transferred to skilled nursing facility (SNF) with Medicare certification in anticipation of skilled care        Expected Discharge Date and Time     Expected Discharge Date Expected Discharge Time    Oct 13, 2017             Fatou Whittington RN

## 2017-10-09 NOTE — PLAN OF CARE
Problem: Patient Care Overview (Adult)  Goal: Plan of Care Review  Outcome: Ongoing (interventions implemented as appropriate)    10/09/17 0421   Coping/Psychosocial Response Interventions   Plan Of Care Reviewed With patient   Patient Care Overview   Progress no change   Outcome Evaluation   Outcome Summary/Follow up Plan Pt. is sleepy but arouses and is oriented. Says she is feeling better. Some jerky movement and has an unsteady gait.          Problem: Older Inpatient, Acutely Ill (Adult)  Goal: Signs and Symptoms of Listed Potential Problems Will be Absent or Manageable (Older Inpatient, Acutely Ill)  Outcome: Ongoing (interventions implemented as appropriate)

## 2017-10-09 NOTE — PLAN OF CARE
Problem: Patient Care Overview (Adult)  Goal: Plan of Care Review  Outcome: Ongoing (interventions implemented as appropriate)    10/09/17 1224   Coping/Psychosocial Response Interventions   Plan Of Care Reviewed With patient   Patient Care Overview   Progress progress toward functional goals is gradual   Outcome Evaluation   Outcome Summary/Follow up Plan pt still needs assist for transfers and gait,needs lots of cues for gait and walker.ambulat 350 ft         Problem: Inpatient Physical Therapy  Goal: Transfer Training Goal 1 LTG- PT  Outcome: Ongoing (interventions implemented as appropriate)    10/05/17 1430 10/09/17 1224   Transfer Training PT LTG   Transfer Training PT LTG, Activity Type all transfers --    Transfer Training PT LTG, [place] Level supervision required --    Transfer Training PT LTG, Assist Device walker, rolling --    Transfer Training PT LTG, Outcome --  goal ongoing       Goal: Gait Training Goal LTG- PT  Outcome: Ongoing (interventions implemented as appropriate)    10/05/17 1430 10/09/17 1224   Gait Training PT LTG   Gait Training Goal PT LTG, Time to Achieve 2 wks --    Gait Training Goal PT LTG, [place] Level supervision required --    Gait Training Goal PT LTG, Assist Device walker, rolling --    Gait Training Goal PT LTG, Distance to Achieve 350 --    Gait Training Goal PT LTG, Outcome --  goal partially met

## 2017-10-09 NOTE — THERAPY DISCHARGE NOTE
Acute Care - Occupational Therapy Treatment Note/Discharge   [facility]     Patient Name: Sarah Beck  : 1935  MRN: [id number]  Today's Date: 10/9/2017  Onset of Illness/Injury or Date of Surgery Date: 10/03/17  Date of Referral to OT: 10/04/17  Referring Physician: MD GRUPO      Admit Date: 10/3/2017    Visit Dx:     ICD-10-CM ICD-9-CM   1. Pharyngeal dysphagia R13.13 787.23   2. Impaired mobility and ADLs Z74.09 799.89   3. Impaired functional mobility, balance, gait, and endurance Z74.09 V49.89     Patient Active Problem List   Diagnosis   • Syncope with collapse   • LBBB (left bundle branch block)   • Seizure disorder   • RENATA treated with BiPAP   • Acute respiratory failure with hypoxia (POA)   • Essential hypertension   • Malignant neoplasm of left lung s/p radiation   • Bilateral pneumonia             Adult Rehabilitation Note       10/09/17 1120 10/09/17 1115 10/06/17 1600    Rehab Assessment/Intervention    Discipline occupational therapist  -CLAIRE physical therapy assistant  -UD speech language pathologist  -SM,RK,SM2    Document Type therapy note (daily note);discharge summary  -CLAIRE therapy note (daily note)  -UD therapy note (daily note)  -SM,RK,SM2    Subjective Information agree to therapy;no complaints  -CLAIRE agree to therapy;complains of;weakness  -UD no complaints;agree to therapy  -SM,RK,SM2    Patient Effort, Rehab Treatment good  -CLAIRE good  -UD adequate  -SM,RK,SM2    Symptoms Noted During/After Treatment fatigue  -CLAIRE fatigue  -UD     Precautions/Limitations fall precautions  -CLAIRE fall precautions  -UD     Recorded by [CLAIRE] Emily Renee, OT [UD] Eula Hernandez, UYSUF [SM,RK,SM2] Ashely Sol, MS CCC-SLP (r) Derek Elliott, Speech Therapy Student (t) Ashely Sol, MS CCC-SLP (c)    Vital Signs    O2 Delivery Post Treatment  room air  -UD     Pre Patient Position  Supine  -UD     Intra Patient Position  Standing  -UD     Post Patient Position  Sitting  -UD     Recorded by  [UD]  Eula Hernandez PTA     Pain Assessment    Pain Assessment No/denies pain  -CLAIRE No/denies pain  -UD No/denies pain  -SM,RK,SM2    Cui-Jones FACES Pain Rating  0  -UD 0  -SM,RK,SM2    Pain Score  0  -UD 0  -SM,RK,SM2    Post Pain Score  0  -UD 0  -SM,RK,SM2    Recorded by [CLAIRE] Emily Renee, OT [UD] Eula Hernandez, PTA [SM,RK,SM2] Ashely Sol MS CCC-SLP (r) Derek Elliott, Speech Therapy Student (t) Ashely Sol MS CCC-SLP (c)    Vision Assessment/Intervention    Visual Impairment WFL with corrective lenses  -CLAIRE WFL with corrective lenses  -UD WFL with corrective lenses  -SM,RK,SM2    Recorded by [CLAIRE] Emily Renee, OT [UD] Eula Hernandez, PTA [SM,RK,SM2] Ashely Sol MS CCC-SLP (r) Derek Elliott, Speech Therapy Student (t) Ashely Sol MS CCC-SLP (c)    Cognitive Assessment/Intervention    Current Cognitive/Communication Assessment impaired  -CLAIRE      Orientation Status oriented to;person  -CLAIRE oriented to;person  -UD     Follows Commands/Answers Questions 100% of the time;able to follow single-step instructions;needs cueing;needs repetition  -CLAIRE able to follow single-step instructions  -UD     Personal Safety mild impairment;decreased insight to deficits  -CLAIRE      Personal Safety Interventions fall prevention program maintained;gait belt;nonskid shoes/slippers when out of bed  -CLAIRE      Recorded by [CLAIRE] Emily Renee, OT [UD] Eula Hernandez, YUSUF     Dysphagia Treatment Objectives and Progress    Dysphagia Treatment Objectives   Improve hyolaryngeal excursion;Improve tongue base & pharyngeal wall squeeze  -SM,RK,SM2    Recorded by   [SM,RK,SM2] Ashely Sol MS CCC-SLP (r) Derek Elliott, Speech Therapy Student (t) Ashely Sol MS CCC-SLP (c)    Improve hyolaryngeal excursion    To improve hyolaryngeal excursion, patient will:   Complete head lift sustained (comment number of seconds);Complete head lift repetitive (comment number of lifts);Complete chin tuck against  resistance (comment number of repetitions);80%;without cues  -SM,RK,SM2    Status: Improve hyolaryngeal excursion   Progressing as expected  -SM,RK,SM2    Hyolaryngeal Excursion Progress   with consistent cues;discontinue achieved;80%  -SM,RK,SM2    Recorded by   [SM,RK,SM2] Ashely Sol MS CCC-SLP (r) Derek Elliott, Speech Therapy Student (t) Ashely Sol MS CCC-SLP (c)    Improve tongue base & pharyngeal wall squeeze    To improve tongue base & pharyngeal wall squeeze, patient will:   Complete effortful swallow;Complete tongue hold swallow;80%;without cues  -SM,RK,SM2    Status: Improve tongue base & pharyngeal wall squeeze   Progressing as expected  -SM,RK,SM2    Tongue Base/Pharyngeal Wall Squeeze Progress   70%;with consistent cues;80%;discontinue achieved  -SM,RK,SM2    Recorded by   [SM,RK,SM2] Ashely Sol MS CCC-SLP (r) Derek Elliott, Speech Therapy Student (t) Ashely Sol MS CCC-SLP (c)    Bed Mobility, Assessment/Treatment    Bed Mobility, Scoot/Bridge, [place] minimum assist (75% patient effort);verbal cues required   to scoot to HOB  -CLAIRE      Bed Mob, Supine to Sit, [place]  contact guard assist  -UD     Bed Mob, Sit to Supine, [place] contact guard assist  -CLAIRE      Bed Mob, Sidelying to Sit, [place] verbal cues required  -CLAIRE      Recorded by [CLAIRE] Emily Renee, OT [UD] Eula Hernandez PTA     Transfer Assessment/Treatment    Transfers, Sit-Stand [place]  contact guard assist  -UD     Transfers, Stand-Sit [place] contact guard assist;2 person assist required;verbal cues required  -CLAIRE contact guard assist;2 person assist required  -UD     Transfers, Sit-Stand-Sit, Assist Device rolling walker  -CLAIRE rolling walker  -UD     Recorded by [CLAIRE] Emily Renee, OT [UD] Eula Hernandez PTA     Gait Assessment/Treatment    Gait, [place] Level  2 person assist required;minimum assist (75% patient effort)  -UD     Gait, Assistive Device  rolling  walker  -UD     Gait, Distance (Feet)  350   steers to rt side  -UD     Gait, Gait Deviations  amor decreased;forward flexed posture;step length decreased  -UD     Gait, Safety Issues  step length decreased  -UD     Gait, Impairments  strength decreased  -UD     Gait, Comment  --   needs cues for steering and for gait  -UD     Recorded by  [UD] Eula Hernandez PTA     Functional Mobility    Functional Mobility- Ind. Level minimum assist (75% patient effort);2 person assist required  -CLAIRE      Functional Mobility- Device rolling walker  -CLAIRE      Functional Mobility-Distance (Feet) 200  -CLAIRE      Functional Mobility- Safety Issues step length decreased  -CLAIRE      Functional Mobility- Comment PT out in chau ambulating pt.  but trying to assist pt. and roll wx.  Pt. vering walker to right and PT difficulty assisting so asked OT to assist rest of way back to room.  Assist walker to stay in  midline pt. pusing to right.  -CLAIRE      Recorded by [CLAIRE] Emily Renee OT      Lower Body Dressing Assessment/Training    LB Dressing Assess/Train, Clothing Type doffing:;donning:;slipper socks   L  -CLAIRE      LB Dressing Assess/Train, Position sitting  -CLAIRE      LB Dressing Assess/Train, [place] contact guard assist  -CLAIRE      LB Dressing Assess/Train, Impairments impaired balance  -CLAIRE      LB Dressing Assess/Train, Comment Pt. with jerking spells and jerking forward once needing CGA for balance with task.  -CLAIRE      Recorded by [CLAIRE] Emily Renee OT      Grooming Assessment/Training    Grooming Assess/Train, Position sitting  -CLAIRE      Grooming Assess/Train, Indepen Level minimum assist (75% patient effort)  -CLAIRE      Grooming Assess/Train, Impairments coordination impaired  -CLAIRE      Grooming Assess/Train, Comment Pt. combed hair, but could not part hair.  -CLAIRE      Recorded by [CLAIRE] Emily Renee OT      Motor Skills/Interventions    Additional Documentation Balance Skills Training (Group)  -CLAIRE      Recorded by [CLAIRE] Emily Renee,  OT      Balance Skills Training    Sitting-Level of Assistance Contact guard   with dressing  -CLAIRE      Sitting-Balance Support Feet supported;Right upper extremity supported;Left upper extremity supported  -CLAIRE      Standing-Level of Assistance Contact guard;x2  -CLAIRE      Static Standing Balance Support Right upper extremity supported;Left upper extremity supported  -CLAIRE      Gait Balance-Level of Assistance Minimum assistance;x2  -CLAIRE      Gait Balance Support assistive device  -CLAIRE      Recorded by [CLAIRE] Emily Renee OT      Therapy Exercises    Bilateral Lower Extremities AROM:;10 reps;glut sets  -CLAIRE      Bilateral Upper Extremity AROM:;10 reps;sitting;elbow flexion/extension;shoulder abduction/adduction;shoulder extension/flexion;shoulder horizontal abd/add;shoulder protraction/retraction;shoulder rolls/shrugs   much cueing to stay focused.  -CLAIRE      Recorded by [CLAIRE] Emily Renee OT      Positioning and Restraints    Pre-Treatment Position other (comment)   in hallway with PT  -CLAIRE in bed  -UD     Post Treatment Position bed  -CLAIRE bed  -UD     In Bed supine;call light within reach;encouraged to call for assist;exit alarm on;with family/caregiver  -CLAIRE notified nsg;sitting EOB;call light within reach;with family/caregiver;side rails up x2  -UD     Recorded by [CLAIRE] Emily Renee, OT [UD] Eula Hernandez PTA       User Key  (r) = Recorded By, (t) = Taken By, (c) = Cosigned By    Initials Name Effective Dates    CLAIRE Renee, OT 06/22/15 -     DOMINGA Hernandez PTA 06/22/15 -      Ashely Sol MS CCC-SLP 06/22/15 -     MARTINA Elliott, Speech Therapy Student 08/24/17 -                 OT Goals       10/09/17 1432 10/05/17 1019       Transfer Training OT LTG    Transfer Training OT LTG, Date Established  10/05/17  -AN     Transfer Training OT LTG, Time to Achieve  1 wk  -AN     Transfer Training OT LTG, Activity Type  all transfers  -AN     Transfer Training OT LTG, [place] Level  contact guard  assist  -AN     Transfer Training OT LTG, Additional Goal  with AD/DME as needed.  -AN     Transfer Training OT LTG, Outcome goal met   met transfers this date  -CLAIRE      Range of Motion OT LTG    Range of Motion Goal OT LTG, Date Established  10/05/17  -AN     Range of Motion Goal OT LTG, Time to Achieve  1 wk  -AN     Range of Motion Goal OT LTG, Additional Goal  Pt will participate in daily UE ex program to increase functional ROM, strength and endurance.  -AN     Range of Motion Goal OT LTG, Outcome goal met  -CLAIRE      LB Dressing OT LTG    LB Dressing Goal OT LTG, Date Established  10/05/17  -AN     LB Dressing Goal OT LTG, Time to Achieve  1 wk  -AN     LB Dressing Goal OT LTG, [place] Level  minimum assist (75% patient effort)  -AN     LB Dressing Goal OT LTG, Outcome goal partially met   met socks only, no other clothing  -CLAIRE      LB Dressing Goal OT LTG, Reason Goal Not Met discharged from facility  -LCAIRE      Activity Tolerance OT LTG    Activity Tolerance Goal OT LTG, Date Established  10/05/17  -AN     Activity Tolerance Goal OT LTG, Time to Achieve  1 wk  -AN     Activity Tolerance Goal OT LTG, Activity Level  10 min activity  -AN     Activity Tolerance Goal OT LTG, Additional Goal  with one rest break to increase functional endurance for ADLs.  -AN     Activity Tolerance Goal OT LTG, Outcome goal met  -CLAIRE        User Key  (r) = Recorded By, (t) = Taken By, (c) = Cosigned By    Initials Name Provider Type    CLAIRE Renee, OT Occupational Therapist    GIOVANNY Mcnamara, OT Occupational Therapist          Occupational Therapy Education     Title: PT OT SLP Therapies (Resolved)     Topic: Occupational Therapy (Resolved)     Point: ADL training (Resolved)    Description: Instruct learner(s) on proper safety adaptation and remediation techniques during self care or transfers.   Instruct in proper use of assistive devices.    Learning Progress Summary    Learner Readiness Method Response Comment  Documented by Status   Patient Acceptance E,D NR UE ROm exer, wx safety  10/09/17 1431 Active    Acceptance E,D DU,NR   10/09/17 1224 Done    Acceptance E,TB,D VU,DU,NR   10/08/17 0513 Done    Acceptance E,D VU,DU,NR Discussed deficits and contributions to decreased ADL's. Discussed POC and discharge planning.  10/05/17 1018 Done   Family Acceptance E,D DU,NR   10/09/17 1224 Done    Acceptance E,D VU,DU,NR Discussed deficits and contributions to decreased ADL's. Discussed POC and discharge planning.  10/05/17 1018 Done               Point: Home exercise program (Resolved)    Description: Instruct learner(s) on appropriate technique for monitoring, assisting and/or progressing therapeutic exercises/activities.    Learning Progress Summary    Learner Readiness Method Response Comment Documented by Status   Patient Acceptance E,D NR UE ROm exer, wx safety  10/09/17 1431 Active    Acceptance E,D DU,NR   10/09/17 1224 Done    Acceptance E,TB,D VU,DU,NR   10/08/17 0513 Done   Family Acceptance E,D DU,NR   10/09/17 1224 Done               Point: Precautions (Resolved)    Description: Instruct learner(s) on prescribed precautions during self-care and functional transfers.    Learning Progress Summary    Learner Readiness Method Response Comment Documented by Status   Patient Acceptance E,D DU,NR   10/09/17 1224 Done    Acceptance E,TB,D VU,DU,NR   10/08/17 0513 Done   Family Acceptance E,D DU,NR   10/09/17 1224 Done                      User Key     Initials Effective Dates Name Provider Type Discipline     06/22/15 -  Emily Renee, OT Occupational Therapist OT     06/22/15 -  Eula Hernandez PTA Physical Therapy Assistant PT     06/22/15 -  Olivia Mcnamara, OT Occupational Therapist OT     08/22/16 -  Susana Perez, MADELEINE Registered Nurse Nurse                OT Recommendation and Plan  Anticipated Discharge Disposition: skilled nursing facility  Therapy Frequency: daily  Plan of Care  Review  Plan Of Care Reviewed With: patient  Progress: improving  Outcome Summary/Follow up Plan: Pt. met 3/4 goals, increased ADL indep and activity tolerance.  Pt. to SNF today.          Outcome Measures       10/09/17 1120 10/09/17 1115       How much help from another person do you currently need...    Turning from your back to your side while in flat bed without using bedrails?  3  -UD     Moving from lying on back to sitting on the side of a flat bed without bedrails?  3  -UD     Moving to and from a bed to a chair (including a wheelchair)?  3  -UD     Standing up from a chair using your arms (e.g., wheelchair, bedside chair)?  3  -UD     Climbing 3-5 steps with a railing?  2  -UD     To walk in hospital room?  3  -UD     AM-PAC 6 Clicks Score  17  -UD     How much help from another is currently needed...    Putting on and taking off regular lower body clothing? 3  -CLAIRE      Bathing (including washing, rinsing, and drying) 2  -CLAIRE      Toileting (which includes using toilet bed pan or urinal) 3  -CLAIRE      Putting on and taking off regular upper body clothing 3  -CLAIRE      Taking care of personal grooming (such as brushing teeth) 3  -CLAIRE      Eating meals 4  -CLAIRE      Score 18  -CLAIRE      Functional Assessment    Outcome Measure Options AM-PAC 6 Clicks Daily Activity (OT)  -CLAIRE        User Key  (r) = Recorded By, (t) = Taken By, (c) = Cosigned By    Initials Name Provider Type    CLAIRE Renee OT Occupational Therapist    DOMINGA Hernandez PTA Physical Therapy Assistant           Time Calculation:          Time Calculation- OT       10/09/17 1435          Time Calculation- OT    OT Start Time 1120  -CLAIRE      Total Timed Code Minutes- OT 15 minute(s)  -CLAIRE      OT Received On 10/09/17  -CLAIRE      OT Goal Re-Cert Due Date 10/15/17  -CLAIRE        User Key  (r) = Recorded By, (t) = Taken By, (c) = Cosigned By    Initials Name Provider Type    CLAIRE Renee OT Occupational Therapist          Therapy Charges for Today      Code Description Service Date Service Provider Modifiers Qty    [id number]  OT THERAPEUTIC ACT EA 15 MIN 10/9/2017 Emily Renee OT GO 1               OT Discharge Summary  Anticipated Discharge Disposition: skilled nursing facility  Reason for Discharge: Discharge from facility  Outcomes Achieved: Patient able to partially acheive established goals  Discharge Destination: SNF    Emily Renee OT  10/9/2017

## 2017-10-09 NOTE — PLAN OF CARE
Problem: Patient Care Overview (Adult)  Goal: Plan of Care Review  Outcome: Outcome(s) achieved Date Met:  10/09/17    10/09/17 1432   Coping/Psychosocial Response Interventions   Plan Of Care Reviewed With patient   Patient Care Overview   Progress improving   Outcome Evaluation   Outcome Summary/Follow up Plan Pt. met 3/4 goals, increased ADL indep and activity tolerance. Pt. to SNF today.         Problem: Inpatient Occupational Therapy  Goal: Transfer Training Goal 1 LTG- OT  Outcome: Outcome(s) achieved Date Met:  10/09/17    10/05/17 1019 10/09/17 1432   Transfer Training OT LTG   Transfer Training OT LTG, Date Established 10/05/17 --    Transfer Training OT LTG, Time to Achieve 1 wk --    Transfer Training OT LTG, Activity Type all transfers --    Transfer Training OT LTG, [place] Level contact guard assist --    Transfer Training OT LTG, Additional Goal with AD/DME as needed. --    Transfer Training OT LTG, Outcome --  goal met  (met transfers this date)       Goal: Range of Motion Goal LTG- OT  Outcome: Outcome(s) achieved Date Met:  10/09/17    10/09/17 1432   Range of Motion OT LTG   Range of Motion Goal OT LTG, Outcome goal met       Goal: LB Dressing Goal LTG- OT  Outcome: Unable to achieve outcome(s) by discharge Date Met:  10/09/17    10/05/17 1019 10/09/17 1432   LB Dressing OT LTG   LB Dressing Goal OT LTG, Date Established 10/05/17 --    LB Dressing Goal OT LTG, Time to Achieve 1 wk --    LB Dressing Goal OT LTG, [place] Level minimum assist (75% patient effort) --    LB Dressing Goal OT LTG, Outcome --  goal partially met  (met socks only, no other clothing)   LB Dressing Goal OT LTG, Reason Goal Not Met --  discharged from facility       Goal: Activity Tolerance Goal LTG- OT  Outcome: Outcome(s) achieved Date Met:  10/09/17    10/09/17 1432   Activity Tolerance OT LTG   Activity Tolerance Goal OT LTG, Outcome goal met

## 2017-10-09 NOTE — PROGRESS NOTES
"[facility] at [facility]  Progress Note       LOS: 6 days   Patient Care Team:  Damian Miller MD as PCP - General (General Practice)    Chief Complaint:  Follow up syncope    Subjective     No complaints of CP or SOB. BP has been labile 130s-160s. No syncope.Few \"episodes\" of weakness but no syncope.  Plan is to go to [facility] when medically stable.    Daughter and  at bedside.  Daughter concerned about site on right neck where patient previously pulled out her port.    Review of Systems:   Pertinent positives in HPI, all others reviewed and negative.      Objective       Current Facility-Administered Medications:   •  acetaminophen (TYLENOL) tablet 1,000 mg, 1,000 mg, Oral, Q6H PRN, REN Alexander, 1,000 mg at 10/08/17 1721  •  carBAMazepine (TEGretol) tablet 200 mg, 200 mg, Oral, Q12H, Maria Elena Jung MD, 200 mg at 10/09/17 0816  •  cefepime (MAXIPIME) 2 g/100 mL 0.9% NS (mbp), 2 g, Intravenous, Q12H, WILDER Matthews [facility], Last Rate: 25 mL/hr at 10/08/17 0506, 2 g at 10/09/17 0605  •  dextrose (D50W) solution 25 g, 25 g, Intravenous, Q15 Min PRN, Jie Jimenez MD  •  dextrose (GLUTOSE) oral gel 15 g, 15 g, Oral, Q15 Min PRN, Jie Jimenez MD  •  enoxaparin (LOVENOX) syringe 40 mg, 40 mg, Subcutaneous, Daily, Jie Jimenez MD, 40 mg at 10/09/17 0816  •  glucagon (GLUCAGEN) injection 1 mg, 1 mg, Subcutaneous, Q15 Min PRN, Jie Jimenez MD  •  hydrALAZINE (APRESOLINE) injection 10 mg, 10 mg, Intravenous, Q6H PRN, REN Wilkins  •  insulin lispro (humaLOG) injection 0-7 Units, 0-7 Units, Subcutaneous, 4x Daily AC & at Bedtime, Jie Jimenez MD, 2 Units at 10/08/17 2101  •  ipratropium-albuterol (DUO-NEB) nebulizer solution 3 mL, 3 mL, Nebulization, Q4H PRN, Jie Jimenez MD  •  levETIRAcetam (KEPPRA) tablet 250 mg, 250 mg, Oral, Q12H, Maria Elena Jung MD, 250 mg at 10/09/17 0816  •  sodium chloride 0.9 % flush 1-10 mL, 1-10 mL, Intravenous, PRN, " "Jie Jimenez MD    Vital Sign Min/Max for last 24 hours  Temp  Min: 98.3 °F (36.8 °C)  Max: 98.3 °F (36.8 °C)   BP  Min: 143/64  Max: 143/64   Pulse  Min: 67  Max: 67   Resp  Min: 20  Max: 20   No Data Recorded   No Data Recorded   Weight  Min: 175 lb 6.4 oz (79.6 kg)  Max: 175 lb 6.4 oz (79.6 kg)     Flowsheet Rows         First Filed Value    Admission Height  66\" (167.6 cm) Documented at 10/03/2017 0915    Admission Weight  166 lb (75.3 kg) Documented at 10/03/2017 0915            Intake/Output Summary (Last 24 hours) at 10/09/17 0822  Last data filed at 10/09/17 0605   Gross per 24 hour   Intake                0 ml   Output              800 ml   Net             -800 ml       Physical Exam:     General Appearance:    Alert, cooperative, in no acute distress   Lungs:     CTAB, no wheezes, rhonchi or rales.     Heart:    RRR NL S1 S2,  S4   Chest Wall:    No abnormalities observed, tender to palpation.    Abdomen:     Hyperactive bowel sounds, no masses, no organomegaly, soft        non-tender, non-distended, no guarding, no rebound                tenderness   Extremities:   Moves all extremities well, no edema, no cyanosis, no             redness   Pulses:   Pulses palpable and equal bilaterally +2 pedal pulses   Skin:   No bleeding, bruising or rash.  Central line right neck - current site without erythema.  Dressing intact.  Old port site with cellulitic changes and erythema.        Results Review:     Results from last 7 days  Lab Units 10/03/17  1224   WBC 10*3/mm3 10.16   HEMOGLOBIN g/dL 11.1*   HEMATOCRIT % 33.9*   PLATELETS 10*3/mm3 234       Results from last 7 days  Lab Units 10/03/17  1224   SODIUM mmol/L 138   POTASSIUM mmol/L 4.3   CHLORIDE mmol/L 107   CO2 mmol/L 29.0   BUN mg/dL 13   CREATININE mg/dL 0.80   GLUCOSE mg/dL 102*                                Intake/Output Summary (Last 24 hours) at 10/09/17 0822  Last data filed at 10/09/17 0605   Gross per 24 hour   Intake                0 ml "   Output              800 ml   Net             -800 ml       I personally viewed and interpreted the patient's EKG/Telemetry data    EKG: 10/3/17: NSR 83 bpm, LBBB    Telemetry: NSR        Assessment/Plan   1.  Recurrent Syncope, without injury:                        -  To date, no documented arrhythmias or pauses. Recent event monitor reviewed with no evidence of bradycardia/pausease    - Neurology following, MRI brain unremarkable.   -  At this time, no evidence for permanent pacemaker.                          2.   Ventricular fibrillation                        -  tele strips from EMS obtained and show possible VT/VF vs noise- difficult to interpret, poor quality                        -  Patient did receive CPR and shock ×1.                        -  Essentially normal coronary arteries on [facility] 9/30/17, normal EF. Echocardiogram 9/27/17 EF 65%   -  No evidence for ICD implantation.       3.  Left bundle branch block     4.  Pneumonia                        -  on antibiotics per hospitalists.      5.  Seizure disorder/syncope                        -  Diagnosed 20 years ago.  On chronic Tegretol. Neurology following - added second anti-seizure medication.     6.  Essential hypertension                        -  with labile readings, possible etiology of recurrent syncope     7.  RENATA                        -  Continue with BiPAP     8.  Left lower lobe lung carcinoma                        -  Status post radiation ×5, March 2017.  First CT s/p radiation showed improvement.  Next CT scheduled for October 24.                        -  Followed by Dr. Duffy, [facility].      9. Dementia    Will draw CBC.  Consider ID consultation for evaluation of right sided neck infection (?).      Peggy Holden PA-C  10/09/17  8:22 AM         Peggy COKER PA-C personally performed the services face to face as described and documented by the above named individual. I have made any necessary edits and  it is both accurate and complete 10/9/2017  8:22 AM     Had discussion with Son on Friday. Pt not a candidate for ICD implantation at this time. No evidence for PM need either. Family wishes to be conservative which I agree with as well.       I have read the above document and agree    GT

## 2017-10-09 NOTE — DISCHARGE SUMMARY
[facility] Medicine Services  DISCHARGE SUMMARY       Date of Admission: 10/3/2017  Date of Discharge:  10/9/2017  Primary Care Physician: Damian Miller MD  Consulting Physician(s)     Provider Relationship Specialty    Jatin Jesus MD Consulting Physician Cardiology    Maria Elena Jung MD Consulting Physician Neurology          Discharge Diagnoses:  Active Hospital Problems (** Indicates Principal Problem)    Diagnosis Date Noted   • **Syncope with collapse [R55] 10/03/2017   • LBBB (left bundle branch block) [I44.7] 10/03/2017   • Seizure disorder [G40.909] 10/03/2017   • RENATA treated with BiPAP [G47.33] 10/03/2017   • Acute respiratory failure with hypoxia (POA) [J96.01] 10/03/2017   • Essential hypertension [I10] 10/03/2017   • Malignant neoplasm of left lung s/p radiation [C34.92] 10/03/2017   • Bilateral pneumonia [J18.9] 10/03/2017      Resolved Hospital Problems    Diagnosis Date Noted Date Resolved   No resolved problems to display.       Presenting Problem/History of Present Illness  Syncope [R55]     Chief Complaint on Day of Discharge:  FU syncope    History of Present Illness on Day of Discharge:   Patient resting comfortably in bed this morning.  No new complaints other than some drowsiness.  Family at bedside.      Hospital Course  Patient is a 82 y.o. female with a history of CVA with residual generalized weakness, diabetes, obstructive sleep apnea on BiPAP, a history of lung cancer status post radiation followed by , seizure disorder on Tegretol, and 2 years of regular syncopal episodes.  She presented from an outside facility after being admitted there 9/26/2017 status post CODE BLUE at home.  The patient reported that she was seated in a chair and passed out and had some jerks and was unresponsive requiring chest compressions.  When EMS arrived they found her to be in V. fib/V. tach and she was shocked once.  She was intubated and transferred to the outside  facilities ICU and subsequently underwent a left heart catheter that showed clean coronaries and a normal EF.  The patient was extubated.  She had a CT of her chest that was concerning for pneumonia and was started on antibiotics.  Her EKG showed a left bundle branch block.  The etiology of the patient's syncope was unknown and she was transferred to [facility] for a possible pacemaker placement.  The patient reported some discomfort due to her chest compressions but arrived awake, alert with some confusion.  She does have a history of Alzheimer's disease but her family reports that she has been somewhat more confused than her baseline.  She is a history of hypertension and on review of her medications takes multiple antihypertensive/diuretics.  These were held upon admission.  She was seen by cardiology, who reviewed the EKG strips from EMS as well as all of her cardiac tracings while inpatient.  They found no indication for permanent pacemaker placement or a defibrillator.  The patient had no further syncopal episodes.  She was seen by neurology and there was some concern that she may be having increased seizure activity causing her symptoms.  She was started on oral Keppra as well as continued on her Tegretol.  The patient is now medically stable and will be transferred to skilled nursing facility today for rehabilitation.  The patient will be transferred off of all of her antihypertensive medications and she will need follow-up with her primary cardiologist as an outpatient.  The exact mechanism of her syncope is still unknown.  She'll also need follow-up with her neurologist once discharged.    Procedures Performed         Consults:   Consults     Date and Time Order Name Status Description    10/4/2017 0844 Inpatient Consult to Neurology Completed           Pertinent Test Results:  Lab Results   Component Value Date    WBC 9.14 10/09/2017    HGB 10.6 (L) 10/09/2017    HCT 33.3 (L) 10/09/2017     MCV 93.5 10/09/2017     10/09/2017     Lab Results   Component Value Date    GLUCOSE 102 (H) 10/03/2017    CALCIUM 8.9 10/03/2017     10/03/2017    K 4.3 10/03/2017    CO2 29.0 10/03/2017     10/03/2017    BUN 13 10/03/2017    CREATININE 0.80 10/03/2017    EGFRIFNONA 69 10/03/2017    BCR 16.3 10/03/2017    ANIONGAP 2.0 (L) 10/03/2017     Imaging Results (last 7 days)     Procedure Component Value Units Date/Time    CT Angiogram Chest With & Without Contrast [[id number]] Collected:  10/03/17 1356     Updated:  10/03/17 1651    Narrative:       EXAMINATION: CT ANGIOGRAM CHEST W WO CONTRAST- 10/03/2017     INDICATION: syncope with collapse      TECHNIQUE: CT angiogram chest with and without intravenous contrast. 2-D  reconstructions performed.     The radiation dose reduction device was turned on for each scan per the  ALARA (As Low as Reasonably Achievable) protocol.     COMPARISON: NONE     FINDINGS: Thyroid is homogeneous in attenuation without dominant nodule  identified. No bulky mediastinal adenopathy. Cardiac size enlarged  without pericardial effusion. Atherosclerotic involvement of the aortic  arch and great vessel origins without significant luminal impact.  Nonaneurysmal thoracic aorta. No evidence for acute aortic injury,  pseudoaneurysm or dissection. Visualized portions of the abdominal aorta  demonstrate moderate to severe luminal narrowing of the celiac origin as  well as mild to moderate SMA origin narrowing. Central pulmonary  arterial system is well-opacified without filling defect to suggest  pulmonary embolus. Central airways are patent. Extended lung windows  demonstrate confluent opacifications involving the left lower lobe  superior and lateral segments with air bronchograms present. Bilateral  small volume pleural effusions with adjacent atelectasis. Minimal  pulmonary nodularity likely reactive noted within the right upper lobe.  Multilevel degenerative changes of the spine  without aggressive osseous  lesion identified. ACDF hardware in place lower cervical segments. No  aggressive soft tissue body wall findings. Visualized portions of the  upper abdomen grossly unremarkable with the exception of aforementioned  vascular findings.       Impression:       1. No evidence for acute aortic injury, pseudoaneurysm or dissection  with atherosclerotic involvement greatest in the abdominal portions  involving the celiac and SMA origins.  2. No central pulmonary filling defect to suggest pulmonary embolus.  3. Consolidative opacifications left lower lobe consistent with acute  infiltrates.  4. Bilateral small volume pleural effusions.     D:  10/03/2017  E:  10/03/2017     This report was finalized on 10/3/2017 4:49 PM by Dr. Chucho Peña.       FL Video Swallow With Speech [[id number]] Collected:  10/04/17 1543     Updated:  10/04/17 1646    Narrative:       EXAMINATION: FL VIDEO SWALLOW W SPEECH-     INDICATION: dysphagia         TECHNIQUE: 1 minute of fluoroscopic time was used for this exam. 1  associated image was saved. The patient was evaluated in the seated  lateral position while taking a variety of consistencies of barium by  mouth under the direction of speech pathology.     COMPARISON: NONE     FINDINGS: There was no penetration and no aspiration with any of the  media ingested.          Impression:       Fluoroscopy provided for a modified barium swallow. Please  see speech therapy report for full details and recommendations.         This report was finalized on 10/4/2017 4:44 PM by Dr. Chucho Peña.       MRI Brain Without Contrast [[id number]] Collected:  10/05/17 0821     Updated:  10/05/17 0912    Narrative:       EXAMINATION: MRI BRAIN WO CONTRAST-     INDICATION: Syncope; R13.13-Dysphagia, pharyngeal phase.      TECHNIQUE: Multiplanar MRI of the brain without intravenous contrast  administration.     COMPARISON: None.     FINDINGS: No restriction on diffusion-weighted sequences.  "Midline  structures are symmetric without evidence of mass, mass effect or  midline shift. Ventricles and sulci are mildly prominent in particular  within the anterior and inferior temporal regions and towards the vertex  consistent with atrophy. Moderate T2/FLAIR hyperintense signal within  the periventricular and deep white matter consistent with chronic small  vessel ischemic disease. Pituitary and sella within normal limits.  Cervicomedullary junction widely patent.  Globes and orbits retain  normal signal characteristics. Visualized paranasal sinuses and mastoid  air cells demonstrate small right mastoid effusion otherwise grossly  clear and well pneumatized. Normal signal flow voids within the distal  internal carotid and basilar arteries.       Impression:       1. No acute intracranial abnormality.  2. Moderate chronic small vessel ischemic changes and volume loss from  atrophy most prominent in the mesiotemporal region.  3. Nonspecific small right mastoid effusion.      D:  10/05/2017  E:  10/05/2017     This report was finalized on 10/5/2017 5:50 PM by Dr. Chucho Peña.           Cultures:           Condition on Discharge:  stable    Physical Exam on Discharge:/73 (BP Location: Right arm, Patient Position: Lying)  Pulse 81  Temp 98.2 °F (36.8 °C) (Oral)   Resp 18  Ht 66\" (167.6 cm)  Wt 175 lb 6.4 oz (79.6 kg)  SpO2 95%  BMI 28.31 kg/m2  Physical Exam    General: Resting in bed, NAD, family at bedside.   HEENT: Normocephalic, mucous membranes moist, pupils equal  Neck: Supple, trachea midline  Cardiovascular: Regular rate and rhythm, S1-S2, no murmur  Respiratory: Clear to auscultation bilaterally, even unlabored breathing on room air  Abdomen: Soft, nontender, nondistended, bowel sounds +  Skin: Clean, dry, intact, no lesions or sores  Extremities: FOX, Symmetrical, pulses +, no edema noted  Neuro: Alert, oriented ×4, appropriate and cooperative,  and pedal pushes equal, sensation " intact    Discharge Disposition  Skilled Nursing Facility (DC - External)    Discharge Medications   Sarah Beck   Home Medication Instructions HARIKA:[id number]    Printed on:10/09/17 1242   Medication Information                      allopurinol (ZYLOPRIM) 300 MG tablet  Take 300 mg by mouth Daily.             aspirin 81 MG EC tablet  Take 81 mg by mouth Daily.             carBAMazepine XR (TEGRETOL-XR) 200 MG 12 hr tablet  Take 200 mg by mouth 2 (Two) Times a Day.             glimepiride (AMARYL) 2 MG tablet  Take 2 mg by mouth Every Morning Before Breakfast.             levETIRAcetam (KEPPRA) 250 MG tablet  Take 1 tablet by mouth Every 12 (Twelve) Hours.             memantine (NAMENDA) 5 MG tablet  Take 5 mg by mouth 2 (Two) Times a Day.             vitamin D (ERGOCALCIFEROL) 08265 units capsule capsule  Take 50,000 Units by mouth 1 (One) Time Per Week.                 Discharge Diet:   Diet Instructions     Diet: Consistent Carbohydrate; Thin       Discharge Diet:  Consistent Carbohydrate   Fluid Consistency:  Thin                 Discharge Care Plan / Instructions:    Activity at Discharge:   Activity Instructions     Activity as Tolerated                     Follow-up Appointments  No future appointments.  Additional Instructions for the Follow-ups that You Need to Schedule     Additional Discharge Follow-Up (Specify Provider)    As directed    To:  primary cardiologist   Follow Up Details:  within a month       Discharge Follow-up with PCP    As directed    Follow Up Details:  1-2 weeks                 Test Results Pending at Discharge       REN Love 10/09/17 12:42 PM    Time: Discharge 45 min    Please note that portions of this note may have been completed with a voice recognition program. Efforts were made to edit the dictations, but occasionally words are mistranscribed.

## 2017-10-09 NOTE — THERAPY TREATMENT NOTE
Acute Care - Physical Therapy Treatment Note  [facility]     Patient Name: Sarah Beck  : 1935  MRN: [id number]  Today's Date: 10/9/2017  Onset of Illness/Injury or Date of Surgery Date: 10/03/17  Date of Referral to PT: 10/04/17  Referring Physician: MD GRUPO    Admit Date: 10/3/2017    Visit Dx:    ICD-10-CM ICD-9-CM   1. Pharyngeal dysphagia R13.13 787.23   2. Impaired mobility and ADLs Z74.09 799.89   3. Impaired functional mobility, balance, gait, and endurance Z74.09 V49.89     Patient Active Problem List   Diagnosis   • Syncope with collapse   • LBBB (left bundle branch block)   • Seizure disorder   • RENATA treated with BiPAP   • Acute respiratory failure with hypoxia (POA)   • Essential hypertension   • Malignant neoplasm of left lung s/p radiation   • Bilateral pneumonia               Adult Rehabilitation Note       10/09/17 1115 10/06/17 1600 10/06/17 1425    Rehab Assessment/Intervention    Discipline physical therapy assistant  -UD speech language pathologist  -MARTINA QUEZADA,GILDARDO physical therapy assistant  -UD    Document Type therapy note (daily note)  -UD therapy note (daily note)  -MARTINA QUEZADA SM2 therapy note (daily note)  -UD    Subjective Information agree to therapy;complains of;weakness  -UD no complaints;agree to therapy  -MARTINA QUEZADA SM2 agree to therapy;complains of;weakness;dyspnea  -UD    Patient Effort, Rehab Treatment good  -UD adequate  -MARTINA QUEZADA,DAMIEN2 good  -UD    Symptoms Noted During/After Treatment fatigue  -UD  fatigue  -UD    Precautions/Limitations fall precautions  -UD  fall precautions;oxygen therapy device and L/min  -UD    Recorded by [UD] Eula Hernandez PTA [MARTINA QUEZADA,SM2] Ashely Sol, MS CCC-SLP (r) Derek Elliott, Speech Therapy Student (t) Ashely Sol, MS CCC-SLP (c) [UD] Eula Hernandez PTA    Vital Signs    Pre SpO2 (%)   94  -UD    O2 Delivery Pre Treatment   supplemental O2  -UD    Post SpO2 (%)   92  -UD    O2 Delivery Post Treatment room air  -UD  supplemental O2  -UD     Pre Patient Position Supine  -UD  Supine  -UD    Intra Patient Position Standing  -UD  Standing  -UD    Post Patient Position Sitting  -UD  Supine  -UD    Recorded by [UD] Eula Hernandez PTA  [UD] Eula Hernandez PTA    Pain Assessment    Pain Assessment No/denies pain  -UD No/denies pain  -SM,RK,SM2 Cui-Baker FACES  -UD    Cui-Jones FACES Pain Rating 0  -UD 0  -SM,RK,SM2 2  -UD    Pain Score 0  -UD 0  -SM,RK,SM2 2  -UD    Post Pain Score 0  -UD 0  -SM,RK,SM2 2  -UD    Pain Type   Acute pain  -UD    Pain Location   Chest  -UD    Pain Intervention(s)   Repositioned;Ambulation/increased activity  -UD    Recorded by [UD] Eula Hernandez PTA [SM,RK,SM2] Ashely Sol MS CCC-SLP (r) Derek Elliott, Speech Therapy Student (t) Ashely Sol MS CCC-SLP (c) [UD] Eula Hernandez PTA    Vision Assessment/Intervention    Visual Impairment WFL with corrective lenses  -UD WFL with corrective lenses  -SM,RK,SM2 WFL with corrective lenses  -UD    Recorded by [UD] Eula Hernandez PTA [SM,RK,SM2] Ashely Sol MS CCC-SLP (r) Derek Elliott, Speech Therapy Student (t) Ashely Sol MS CCC-SLP (c) [UD] Eula Hernandez PTA    Cognitive Assessment/Intervention    Orientation Status oriented to;person  -UD  oriented x 4  -UD    Follows Commands/Answers Questions able to follow single-step instructions  -UD  100% of the time  -UD    Personal Safety Interventions   fall prevention program maintained  -UD    Recorded by [UD] Eula Hernandez PTA  [UD] Eula Hernandez PTA    Dysphagia Treatment Objectives and Progress    Dysphagia Treatment Objectives  Improve hyolaryngeal excursion;Improve tongue base & pharyngeal wall squeeze  -SM,RK,SM2     Recorded by  [SM,RK,SM2] Ashely Sol MS CCC-SLP (r) Derek Elliott, Speech Therapy Student (t) Ashely Sol MS CCC-SLP (c)     Improve hyolaryngeal excursion    To improve hyolaryngeal excursion, patient will:  Complete head lift sustained (comment number of  seconds);Complete head lift repetitive (comment number of lifts);Complete chin tuck against resistance (comment number of repetitions);80%;without cues  -SM,RK,SM2     Status: Improve hyolaryngeal excursion  Progressing as expected  -SM,RK,SM2     Hyolaryngeal Excursion Progress  with consistent cues;discontinue achieved;80%  -SM,RK,SM2     Recorded by  [SM,RK,SM2] Ashely Sol MS CCC-SLP (r) Derek Elliott, Speech Therapy Student (t) Ashely Sol MS CCC-SLP (c)     Improve tongue base & pharyngeal wall squeeze    To improve tongue base & pharyngeal wall squeeze, patient will:  Complete effortful swallow;Complete tongue hold swallow;80%;without cues  -SM,RK,SM2     Status: Improve tongue base & pharyngeal wall squeeze  Progressing as expected  -SM,RK,SM2     Tongue Base/Pharyngeal Wall Squeeze Progress  70%;with consistent cues;80%;discontinue achieved  -SM,RK,SM2     Recorded by  [SM,RK,SM2] Ashely Sol MS CCC-SLP (r) Derek Elliott, Speech Therapy Student (t) Ashely Sol MS CCC-SLP (c)     Bed Mobility, Assessment/Treatment    Bed Mob, Supine to Sit, [place] contact guard assist  -UD  supervision required  -UD    Recorded by [UD] Eula Hernandez PTA  [UD] Eula Hernandez PTA    Transfer Assessment/Treatment    Transfers, Sit-Stand [place] contact guard assist  -UD  stand by assist  -UD    Transfers, Stand-Sit [place] contact guard assist;2 person assist required  -UD  contact guard assist  -UD    Transfers, Sit-Stand-Sit, Assist Device rolling walker  -UD  rolling walker  -UD    Recorded by [UD] Eula Hernandez PTA  [UD] Eula Hernandez PTA    Gait Assessment/Treatment    Gait, [place] Level 2 person assist required;minimum assist (75% patient effort)  -UD  minimum assist (75% patient effort)  -UD    Gait, Assistive Device rolling walker  -UD  rolling walker  -UD    Gait, Distance (Feet) 350   steers to rt side  -UD  250  -UD    Gait, Gait Deviations amor  decreased;forward flexed posture;step length decreased  -UD  forward flexed posture;step length decreased  -UD    Gait, Safety Issues step length decreased  -UD  supplemental O2;step length decreased  -UD    Gait, Impairments strength decreased  -UD  strength decreased  -UD    Gait, Comment --   needs cues for steering and for gait  -UD  --   cues for directions with walker  -UD    Recorded by [UD] Eula Hernandez PTA  [UD] Eula Hernandez PTA    Positioning and Restraints    Pre-Treatment Position in bed  -UD  in bed  -UD    Post Treatment Position bed  -UD  bed  -UD    In Bed notified nsg;sitting EOB;call light within reach;with family/caregiver;side rails up x2  -UD  notified nsg;supine;call light within reach;with family/caregiver;side rails up x2  -UD    Recorded by [UD] Eula Hernandez PTA  [UD] Eula Hernandez PTA      User Key  (r) = Recorded By, (t) = Taken By, (c) = Cosigned By    Initials Name Effective Dates    UD Eula Hernandez PTA 06/22/15 -     DAMIEN Sol, MS CCC-SLP 06/22/15 -     MARTINA Elliott, Speech Therapy Student 08/24/17 -                 IP PT Goals       10/09/17 1224 10/06/17 1450 10/05/17 1430    Bed Mobility PT LTG    Bed Mobility PT LTG, Time to Achieve   2 wks  -CD    Bed Mobility PT LTG, Activity Type   all bed mobility  -CD    Bed Mobility PT LTG, [place] Level   contact guard assist  -CD    Bed Mobility PT LTG, Outcome  goal met  -UD     Transfer Training PT LTG    Transfer Training PT LTG, Activity Type   all transfers  -CD    Transfer Training PT LTG, [place] Level   supervision required  -CD    Transfer Training PT LTG, Assist Device   walker, rolling  -CD    Transfer Training PT LTG, Outcome goal ongoing  -UD goal ongoing  -UD     Gait Training PT LTG    Gait Training Goal PT LTG, Time to Achieve   2 wks  -CD    Gait Training Goal PT LTG, [place] Level   supervision required  -CD    Gait Training Goal PT LTG, Assist Device   walker, rolling  -CD    Gait  Training Goal PT LTG, Distance to Achieve   350  -CD    Gait Training Goal PT LTG, Outcome goal partially met  -UD goal ongoing  -UD       User Key  (r) = Recorded By, (t) = Taken By, (c) = Cosigned By    Initials Name Provider Type    UD Eula Hernandez, PTA Physical Therapy Assistant    JERRICA Gomez, PT Physical Therapist          Physical Therapy Education     Title: PT OT SLP Therapies (Done)     Topic: Physical Therapy (Done)     Point: Mobility training (Done)    Learning Progress Summary    Learner Readiness Method Response Comment Documented by Status   Patient Acceptance E,D DU,NR   10/09/17 1224 Done    Acceptance E,TB,D VU,DU,NR   10/08/17 0513 Done    Acceptance E,D DU,NR   10/06/17 1449 Done    Acceptance E VU,NR SAFETY WITH MOBILITY, BENEFITS OF OOB ACTIVITY, PROGRESSION OF POC, D/C PLANNING.  10/05/17 1429 Done   Family Acceptance E,D DU,NR   10/09/17 1224 Done    Acceptance E,D DU,NR   10/06/17 1449 Done    Acceptance E VU,NR SAFETY WITH MOBILITY, BENEFITS OF OOB ACTIVITY, PROGRESSION OF POC, D/C PLANNING.  10/05/17 1429 Done               Point: Home exercise program (Done)    Learning Progress Summary    Learner Readiness Method Response Comment Documented by Status   Patient Acceptance E,D DU,NR   10/09/17 1224 Done    Acceptance E,TB,D VU,DU,[facility] 10/08/17 0513 Done    Acceptance E,D DU,NR   10/06/17 1449 Done    Acceptance E VU,NR SAFETY WITH MOBILITY, BENEFITS OF OOB ACTIVITY, PROGRESSION OF POC, D/C PLANNING.  10/05/17 1429 Done   Family Acceptance E,D DU,NR   10/09/17 1224 Done    Acceptance E,D DU,NR   10/06/17 1449 Done    Acceptance E VU,NR SAFETY WITH MOBILITY, BENEFITS OF OOB ACTIVITY, PROGRESSION OF POC, D/C PLANNING.  10/05/17 1429 Done               Point: Body mechanics (Done)    Learning Progress Summary    Learner Readiness Method Response Comment Documented by Status   Patient Acceptance E,D DU,NR   10/09/17 1224 Done    Acceptance E,TB,D VU,DU,[facility]  10/08/17 0513 Done    Acceptance E,D DU,NR   10/06/17 1449 Done    Acceptance E VU,NR SAFETY WITH MOBILITY, BENEFITS OF OOB ACTIVITY, PROGRESSION OF POC, D/C PLANNING.  10/05/17 1429 Done   Family Acceptance E,D DU,NR   10/09/17 1224 Done    Acceptance E,D DU,NR   10/06/17 1449 Done    Acceptance E VU,NR SAFETY WITH MOBILITY, BENEFITS OF OOB ACTIVITY, PROGRESSION OF POC, D/C PLANNING.  10/05/17 1429 Done               Point: Precautions (Done)    Learning Progress Summary    Learner Readiness Method Response Comment Documented by Status   Patient Acceptance E,D DU,NR   10/09/17 1224 Done    Acceptance E,TB,D VU,DU,NR   10/08/17 0513 Done    Acceptance E,D DU,NR   10/06/17 1449 Done    Acceptance E VU,NR SAFETY WITH MOBILITY, BENEFITS OF OOB ACTIVITY, PROGRESSION OF POC, D/C PLANNING.  10/05/17 1429 Done   Family Acceptance E,D DU,NR   10/09/17 1224 Done    Acceptance E,D DU,NR   10/06/17 1449 Done    Acceptance E VU,NR SAFETY WITH MOBILITY, BENEFITS OF OOB ACTIVITY, PROGRESSION OF POC, D/C PLANNING.  10/05/17 1429 Done                      User Key     Initials Effective Dates Name Provider Type Discipline     06/22/15 -  Eula Hernandez, YUSUF Physical Therapy Assistant PT     06/19/15 -  Alisha Gomez, PT Physical Therapist PT     08/22/16 -  Susana Perez, RN Registered Nurse Nurse                    PT Recommendation and Plan  Planned Therapy Interventions: balance training, bed mobility training, gait training, home exercise program, strengthening, transfer training  PT Frequency: daily  Plan of Care Review  Plan Of Care Reviewed With: patient  Progress: progress toward functional goals is gradual  Outcome Summary/Follow up Plan: pt still needs assist for transfers and gait,needs lots of cues for gait and walker.ambulat 350 ft          Outcome Measures       10/09/17 1115 10/06/17 1425       How much help from another person do you currently need...    Turning from your back to your side  while in flat bed without using bedrails? 3  -UD 3  -UD     Moving from lying on back to sitting on the side of a flat bed without bedrails? 3  -UD 4  -UD     Moving to and from a bed to a chair (including a wheelchair)? 3  -UD 3  -UD     Standing up from a chair using your arms (e.g., wheelchair, bedside chair)? 3  -UD 3  -UD     Climbing 3-5 steps with a railing? 2  -UD 2  -UD     To walk in hospital room? 3  -UD 3  -UD     AM-PAC 6 Clicks Score 17  -UD 18  -UD       User Key  (r) = Recorded By, (t) = Taken By, (c) = Cosigned By    Initials Name Provider Type    DOMINGA Hernandez PTA Physical Therapy Assistant           Time Calculation:         PT Charges       10/09/17 1226          Time Calculation    PT Received On 10/09/17  -UD      PT Goal Re-Cert Due Date 10/15/17  -UD      Time Calculation- PT    Total Timed Code Minutes- PT 15 minute(s)  -UD        User Key  (r) = Recorded By, (t) = Taken By, (c) = Cosigned By    Initials Name Provider Type    DOMINGA Hernandez PTA Physical Therapy Assistant          Therapy Charges for Today     Code Description Service Date Service Provider Modifiers Qty    [id number] HC GAIT TRAINING EA 15 MIN 10/9/2017 Eula Hernandez PTA GP 1          PT G-Codes  Outcome Measure Options: AM-PAC 6 Clicks Daily Activity (OT)    Eula Hernandez PTA  10/9/2017

## 2018-11-09 ENCOUNTER — LAB REQUISITION (OUTPATIENT)
Dept: LAB | Facility: HOSPITAL | Age: 83
End: 2018-11-09

## 2018-11-09 DIAGNOSIS — Z00.00 ROUTINE GENERAL MEDICAL EXAMINATION AT A HEALTH CARE FACILITY: ICD-10-CM

## 2018-11-09 LAB
BACTERIA UR QL AUTO: ABNORMAL /HPF
BASOPHILS # BLD AUTO: 0.01 10*3/MM3 (ref 0–0.2)
BASOPHILS NFR BLD AUTO: 0.1 % (ref 0–1)
BILIRUB UR QL STRIP: NEGATIVE
CLARITY UR: ABNORMAL
COD CRY URNS QL: ABNORMAL /HPF
COLOR UR: YELLOW
DEPRECATED RDW RBC AUTO: 52.2 FL (ref 37–54)
EOSINOPHIL # BLD AUTO: 0.12 10*3/MM3 (ref 0–0.3)
EOSINOPHIL NFR BLD AUTO: 1.5 % (ref 0–3)
ERYTHROCYTE [DISTWIDTH] IN BLOOD BY AUTOMATED COUNT: 15 % (ref 11.3–14.5)
GLUCOSE UR STRIP-MCNC: NEGATIVE MG/DL
HCT VFR BLD AUTO: 35.4 % (ref 34.5–44)
HGB BLD-MCNC: 11 G/DL (ref 11.5–15.5)
HGB UR QL STRIP.AUTO: NEGATIVE
HYALINE CASTS UR QL AUTO: ABNORMAL /LPF
IMM GRANULOCYTES # BLD: 0.02 10*3/MM3 (ref 0–0.03)
IMM GRANULOCYTES NFR BLD: 0.3 % (ref 0–0.6)
KETONES UR QL STRIP: NEGATIVE
LEUKOCYTE ESTERASE UR QL STRIP.AUTO: ABNORMAL
LYMPHOCYTES # BLD AUTO: 1.1 10*3/MM3 (ref 0.6–4.8)
LYMPHOCYTES NFR BLD AUTO: 14.1 % (ref 24–44)
MCH RBC QN AUTO: 29.3 PG (ref 27–31)
MCHC RBC AUTO-ENTMCNC: 31.1 G/DL (ref 32–36)
MCV RBC AUTO: 94.4 FL (ref 80–99)
MONOCYTES # BLD AUTO: 0.53 10*3/MM3 (ref 0–1)
MONOCYTES NFR BLD AUTO: 6.8 % (ref 0–12)
NEUTROPHILS # BLD AUTO: 6.02 10*3/MM3 (ref 1.5–8.3)
NEUTROPHILS NFR BLD AUTO: 77.5 % (ref 41–71)
NITRITE UR QL STRIP: NEGATIVE
PH UR STRIP.AUTO: <=5 [PH] (ref 5–8)
PLATELET # BLD AUTO: 257 10*3/MM3 (ref 150–450)
PMV BLD AUTO: 10.6 FL (ref 6–12)
PROT UR QL STRIP: NEGATIVE
RBC # BLD AUTO: 3.75 10*6/MM3 (ref 3.89–5.14)
RBC # UR: ABNORMAL /HPF
REF LAB TEST METHOD: ABNORMAL
SP GR UR STRIP: 1.02 (ref 1–1.03)
SQUAMOUS #/AREA URNS HPF: ABNORMAL /HPF
UROBILINOGEN UR QL STRIP: ABNORMAL
WBC NRBC COR # BLD: 7.78 10*3/MM3 (ref 3.5–10.8)
WBC UR QL AUTO: ABNORMAL /HPF

## 2018-11-09 PROCEDURE — 85025 COMPLETE CBC W/AUTO DIFF WBC: CPT

## 2018-11-09 PROCEDURE — 80048 BASIC METABOLIC PNL TOTAL CA: CPT

## 2018-11-09 PROCEDURE — 81001 URINALYSIS AUTO W/SCOPE: CPT

## 2018-11-10 LAB
ANION GAP SERPL CALCULATED.3IONS-SCNC: 7 MMOL/L (ref 3–11)
BUN BLD-MCNC: 21 MG/DL (ref 9–23)
BUN/CREAT SERPL: 16.5 (ref 7–25)
CALCIUM SPEC-SCNC: 9 MG/DL (ref 8.7–10.4)
CHLORIDE SERPL-SCNC: 106 MMOL/L (ref 99–109)
CO2 SERPL-SCNC: 29 MMOL/L (ref 20–31)
CREAT BLD-MCNC: 1.27 MG/DL (ref 0.6–1.3)
GFR SERPL CREATININE-BSD FRML MDRD: 40 ML/MIN/1.73
GLUCOSE BLD-MCNC: 143 MG/DL (ref 70–100)
POTASSIUM BLD-SCNC: 4.5 MMOL/L (ref 3.5–5.5)
SODIUM BLD-SCNC: 142 MMOL/L (ref 132–146)